# Patient Record
Sex: FEMALE | Race: WHITE | NOT HISPANIC OR LATINO | Employment: OTHER | URBAN - METROPOLITAN AREA
[De-identification: names, ages, dates, MRNs, and addresses within clinical notes are randomized per-mention and may not be internally consistent; named-entity substitution may affect disease eponyms.]

---

## 2022-11-24 ENCOUNTER — HOSPITAL ENCOUNTER (INPATIENT)
Facility: HOSPITAL | Age: 72
LOS: 7 days | Discharge: NURSING, REHAB OR LONG-TERM CARE | DRG: 041 | End: 2022-12-01
Attending: STUDENT IN AN ORGANIZED HEALTH CARE EDUCATION/TRAINING PROGRAM | Admitting: STUDENT IN AN ORGANIZED HEALTH CARE EDUCATION/TRAINING PROGRAM
Payer: MEDICARE

## 2022-11-24 ENCOUNTER — HISTORICAL (OUTPATIENT)
Dept: ADMINISTRATIVE | Facility: HOSPITAL | Age: 72
End: 2022-11-24

## 2022-11-24 DIAGNOSIS — I63.9 CEREBROVASCULAR ACCIDENT: ICD-10-CM

## 2022-11-24 DIAGNOSIS — I63.9 CVA (CEREBRAL VASCULAR ACCIDENT): ICD-10-CM

## 2022-11-24 DIAGNOSIS — I63.9 STROKE: ICD-10-CM

## 2022-11-24 DIAGNOSIS — I63.9 ACUTE ISCHEMIC STROKE: Primary | ICD-10-CM

## 2022-11-24 LAB
ALBUMIN SERPL-MCNC: 3.6 GM/DL (ref 3.4–4.8)
ALBUMIN/GLOB SERPL: 1 RATIO (ref 1.1–2)
ALP SERPL-CCNC: 78 UNIT/L (ref 40–150)
ALT SERPL-CCNC: 21 UNIT/L (ref 0–55)
AST SERPL-CCNC: 25 UNIT/L (ref 5–34)
BASOPHILS # BLD AUTO: 0.02 X10(3)/MCL (ref 0–0.2)
BASOPHILS NFR BLD AUTO: 0.3 %
BILIRUBIN DIRECT+TOT PNL SERPL-MCNC: 0.4 MG/DL
BUN SERPL-MCNC: 16.1 MG/DL (ref 9.8–20.1)
CALCIUM SERPL-MCNC: 9 MG/DL (ref 8.4–10.2)
CHLORIDE SERPL-SCNC: 103 MMOL/L (ref 98–107)
CO2 SERPL-SCNC: 23 MMOL/L (ref 23–31)
CREAT SERPL-MCNC: 0.87 MG/DL (ref 0.55–1.02)
EOSINOPHIL # BLD AUTO: 0.05 X10(3)/MCL (ref 0–0.9)
EOSINOPHIL NFR BLD AUTO: 0.8 %
ERYTHROCYTE [DISTWIDTH] IN BLOOD BY AUTOMATED COUNT: 13.4 % (ref 11.5–17)
GFR SERPLBLD CREATININE-BSD FMLA CKD-EPI: >60 MLS/MIN/1.73/M2
GLOBULIN SER-MCNC: 3.5 GM/DL (ref 2.4–3.5)
GLUCOSE SERPL-MCNC: 126 MG/DL (ref 82–115)
HCT VFR BLD AUTO: 40.5 % (ref 37–47)
HGB BLD-MCNC: 13.5 GM/DL (ref 12–16)
IMM GRANULOCYTES # BLD AUTO: 0.02 X10(3)/MCL (ref 0–0.04)
IMM GRANULOCYTES NFR BLD AUTO: 0.3 %
LYMPHOCYTES # BLD AUTO: 0.84 X10(3)/MCL (ref 0.6–4.6)
LYMPHOCYTES NFR BLD AUTO: 14.2 %
MCH RBC QN AUTO: 30.9 PG (ref 27–31)
MCHC RBC AUTO-ENTMCNC: 33.3 MG/DL (ref 33–36)
MCV RBC AUTO: 92.7 FL (ref 80–94)
MONOCYTES # BLD AUTO: 0.64 X10(3)/MCL (ref 0.1–1.3)
MONOCYTES NFR BLD AUTO: 10.8 %
NEUTROPHILS # BLD AUTO: 4.3 X10(3)/MCL (ref 2.1–9.2)
NEUTROPHILS NFR BLD AUTO: 73.6 %
NRBC BLD AUTO-RTO: 0 %
PLATELET # BLD AUTO: 186 X10(3)/MCL (ref 130–400)
PMV BLD AUTO: 9.9 FL (ref 7.4–10.4)
POTASSIUM SERPL-SCNC: 3.2 MMOL/L (ref 3.5–5.1)
PROT SERPL-MCNC: 7.1 GM/DL (ref 5.8–7.6)
RBC # BLD AUTO: 4.37 X10(6)/MCL (ref 4.2–5.4)
SODIUM SERPL-SCNC: 138 MMOL/L (ref 136–145)
WBC # SPEC AUTO: 5.9 X10(3)/MCL (ref 4.5–11.5)

## 2022-11-24 PROCEDURE — 80053 COMPREHEN METABOLIC PANEL: CPT | Performed by: STUDENT IN AN ORGANIZED HEALTH CARE EDUCATION/TRAINING PROGRAM

## 2022-11-24 PROCEDURE — 85025 COMPLETE CBC W/AUTO DIFF WBC: CPT | Performed by: STUDENT IN AN ORGANIZED HEALTH CARE EDUCATION/TRAINING PROGRAM

## 2022-11-24 PROCEDURE — 25500020 PHARM REV CODE 255: Performed by: STUDENT IN AN ORGANIZED HEALTH CARE EDUCATION/TRAINING PROGRAM

## 2022-11-24 PROCEDURE — 11000001 HC ACUTE MED/SURG PRIVATE ROOM

## 2022-11-24 PROCEDURE — 99285 EMERGENCY DEPT VISIT HI MDM: CPT | Mod: 25

## 2022-11-24 RX ADMIN — IOPAMIDOL 100 ML: 755 INJECTION, SOLUTION INTRAVENOUS at 07:11

## 2022-11-24 NOTE — Clinical Note
Diagnosis: Cerebrovascular accident [[id number]]   Admitting Provider:: FRANCISCO TRAN [[id number]]   Future Attending Provider: ZOILA ORDAZ [[id number]]   Reason for IP Medical Treatment  (Clinical interventions that can only be accomplished in the IP setting? ) :: MRI, neurology evaluation, PT/OT/ST   Estimated Length of Stay:: 2 midnights   I certify that Inpatient services for greater than or equal to 2 midnights are medically necessary:: Yes   Plans for Post-Acute care--if anticipated (pick the single best option):: A. No post acute care anticipated at this time

## 2022-11-25 LAB
ALBUMIN SERPL-MCNC: 3.4 GM/DL (ref 3.4–4.8)
ALBUMIN/GLOB SERPL: 1.3 RATIO (ref 1.1–2)
ALP SERPL-CCNC: 79 UNIT/L (ref 40–150)
ALT SERPL-CCNC: 26 UNIT/L (ref 0–55)
AST SERPL-CCNC: 30 UNIT/L (ref 5–34)
AV INDEX (PROSTH): 1.02
AV MEAN GRADIENT: 7 MMHG
AV PEAK GRADIENT: 12 MMHG
AV REGURGITATION PRESSURE HALF TIME: 539 MS
AV VALVE AREA: 3.21 CM2
AV VELOCITY RATIO: 0.97
BASOPHILS # BLD AUTO: 0.02 X10(3)/MCL (ref 0–0.2)
BASOPHILS NFR BLD AUTO: 0.3 %
BILIRUBIN DIRECT+TOT PNL SERPL-MCNC: 0.4 MG/DL
BSA FOR ECHO PROCEDURE: 2.06 M2
BUN SERPL-MCNC: 16.2 MG/DL (ref 9.8–20.1)
CALCIUM SERPL-MCNC: 8.7 MG/DL (ref 8.4–10.2)
CHLORIDE SERPL-SCNC: 103 MMOL/L (ref 98–107)
CHOLEST SERPL-MCNC: 112 MG/DL
CHOLEST/HDLC SERPL: 3 {RATIO} (ref 0–5)
CO2 SERPL-SCNC: 23 MMOL/L (ref 23–31)
CREAT SERPL-MCNC: 0.74 MG/DL (ref 0.55–1.02)
CRP SERPL HS-MCNC: 17.81 MG/L
CV ECHO LV RWT: 0.51 CM
DOP CALC AO PEAK VEL: 1.73 M/S
DOP CALC AO VTI: 36.9 CM
DOP CALC LVOT AREA: 3.1 CM2
DOP CALC LVOT DIAMETER: 2 CM
DOP CALC LVOT PEAK VEL: 1.68 M/S
DOP CALC LVOT STROKE VOLUME: 118.38 CM3
DOP CALC MV VTI: 36.4 CM
DOP CALCLVOT PEAK VEL VTI: 37.7 CM
E WAVE DECELERATION TIME: 322 MSEC
E/A RATIO: 0.66
E/E' RATIO: 18.2 M/S
ECHO LV POSTERIOR WALL: 1.1 CM (ref 0.6–1.1)
EJECTION FRACTION: 60 %
EOSINOPHIL # BLD AUTO: 0.06 X10(3)/MCL (ref 0–0.9)
EOSINOPHIL NFR BLD AUTO: 0.9 %
ERYTHROCYTE [DISTWIDTH] IN BLOOD BY AUTOMATED COUNT: 13.5 % (ref 11.5–17)
ERYTHROCYTE [SEDIMENTATION RATE] IN BLOOD: 43 MM/HR (ref 0–20)
FRACTIONAL SHORTENING: 34 % (ref 28–44)
GFR SERPLBLD CREATININE-BSD FMLA CKD-EPI: >60 MLS/MIN/1.73/M2
GLOBULIN SER-MCNC: 2.7 GM/DL (ref 2.4–3.5)
GLUCOSE SERPL-MCNC: 142 MG/DL (ref 82–115)
HCT VFR BLD AUTO: 37.7 % (ref 37–47)
HDLC SERPL-MCNC: 40 MG/DL (ref 35–60)
HGB BLD-MCNC: 12.3 GM/DL (ref 12–16)
IMM GRANULOCYTES # BLD AUTO: 0.03 X10(3)/MCL (ref 0–0.04)
IMM GRANULOCYTES NFR BLD AUTO: 0.5 %
INTERVENTRICULAR SEPTUM: 0.98 CM (ref 0.6–1.1)
LDLC SERPL CALC-MCNC: 59 MG/DL (ref 50–140)
LEFT ATRIUM SIZE: 3.2 CM
LEFT ATRIUM VOLUME INDEX MOD: 17.1 ML/M2
LEFT ATRIUM VOLUME MOD: 34.3 CM3
LEFT INTERNAL DIMENSION IN SYSTOLE: 2.81 CM (ref 2.1–4)
LEFT VENTRICLE DIASTOLIC VOLUME INDEX: 40.9 ML/M2
LEFT VENTRICLE DIASTOLIC VOLUME: 82.2 ML
LEFT VENTRICLE MASS INDEX: 74 G/M2
LEFT VENTRICLE SYSTOLIC VOLUME INDEX: 14.8 ML/M2
LEFT VENTRICLE SYSTOLIC VOLUME: 29.8 ML
LEFT VENTRICULAR INTERNAL DIMENSION IN DIASTOLE: 4.28 CM (ref 3.5–6)
LEFT VENTRICULAR MASS: 149.41 G
LV LATERAL E/E' RATIO: 15.17 M/S
LV SEPTAL E/E' RATIO: 22.75 M/S
LVOT MG: 6 MMHG
LVOT MV: 1.12 CM/S
LYMPHOCYTES # BLD AUTO: 1.03 X10(3)/MCL (ref 0.6–4.6)
LYMPHOCYTES NFR BLD AUTO: 15.9 %
MAGNESIUM SERPL-MCNC: 1.8 MG/DL (ref 1.6–2.6)
MCH RBC QN AUTO: 30.1 PG (ref 27–31)
MCHC RBC AUTO-ENTMCNC: 32.6 MG/DL (ref 33–36)
MCV RBC AUTO: 92.4 FL (ref 80–94)
MONOCYTES # BLD AUTO: 0.78 X10(3)/MCL (ref 0.1–1.3)
MONOCYTES NFR BLD AUTO: 12.1 %
MV MEAN GRADIENT: 2 MMHG
MV PEAK A VEL: 1.37 M/S
MV PEAK E VEL: 0.91 M/S
MV PEAK GRADIENT: 7 MMHG
MV STENOSIS PRESSURE HALF TIME: 49 MS
MV VALVE AREA BY CONTINUITY EQUATION: 3.25 CM2
MV VALVE AREA P 1/2 METHOD: 4.49 CM2
NEUTROPHILS # BLD AUTO: 4.6 X10(3)/MCL (ref 2.1–9.2)
NEUTROPHILS NFR BLD AUTO: 70.3 %
NRBC BLD AUTO-RTO: 0 %
PHOSPHATE SERPL-MCNC: 3.2 MG/DL (ref 2.3–4.7)
PISA AR MAX VEL: 4.08 M/S
PISA TR MAX VEL: 2.91 M/S
PLATELET # BLD AUTO: 183 X10(3)/MCL (ref 130–400)
PMV BLD AUTO: 9.6 FL (ref 7.4–10.4)
POTASSIUM SERPL-SCNC: 3.6 MMOL/L (ref 3.5–5.1)
PROT SERPL-MCNC: 6.1 GM/DL (ref 5.8–7.6)
PV PEAK VELOCITY: 1 CM/S
RA PRESSURE: 3 MMHG
RBC # BLD AUTO: 4.08 X10(6)/MCL (ref 4.2–5.4)
SODIUM SERPL-SCNC: 138 MMOL/L (ref 136–145)
TDI LATERAL: 0.06 M/S
TDI SEPTAL: 0.04 M/S
TDI: 0.05 M/S
TR MAX PG: 34 MMHG
TRICUSPID ANNULAR PLANE SYSTOLIC EXCURSION: 1.83 CM
TRIGL SERPL-MCNC: 67 MG/DL (ref 37–140)
TSH SERPL-ACNC: 1.63 UIU/ML (ref 0.35–4.94)
TV REST PULMONARY ARTERY PRESSURE: 37 MMHG
VLDLC SERPL CALC-MCNC: 13 MG/DL
WBC # SPEC AUTO: 6.5 X10(3)/MCL (ref 4.5–11.5)

## 2022-11-25 PROCEDURE — 63600175 PHARM REV CODE 636 W HCPCS: Performed by: NURSE PRACTITIONER

## 2022-11-25 PROCEDURE — 80061 LIPID PANEL: CPT | Performed by: NURSE PRACTITIONER

## 2022-11-25 PROCEDURE — 83735 ASSAY OF MAGNESIUM: CPT | Performed by: NURSE PRACTITIONER

## 2022-11-25 PROCEDURE — 25000003 PHARM REV CODE 250: Performed by: INTERNAL MEDICINE

## 2022-11-25 PROCEDURE — 25000003 PHARM REV CODE 250: Performed by: NURSE PRACTITIONER

## 2022-11-25 PROCEDURE — 80053 COMPREHEN METABOLIC PANEL: CPT | Performed by: NURSE PRACTITIONER

## 2022-11-25 PROCEDURE — 84100 ASSAY OF PHOSPHORUS: CPT | Performed by: NURSE PRACTITIONER

## 2022-11-25 PROCEDURE — 84443 ASSAY THYROID STIM HORMONE: CPT | Performed by: NURSE PRACTITIONER

## 2022-11-25 PROCEDURE — 21400001 HC TELEMETRY ROOM

## 2022-11-25 PROCEDURE — 92610 EVALUATE SWALLOWING FUNCTION: CPT

## 2022-11-25 PROCEDURE — 85651 RBC SED RATE NONAUTOMATED: CPT | Performed by: NURSE PRACTITIONER

## 2022-11-25 PROCEDURE — 85025 COMPLETE CBC W/AUTO DIFF WBC: CPT | Performed by: NURSE PRACTITIONER

## 2022-11-25 PROCEDURE — 99223 1ST HOSP IP/OBS HIGH 75: CPT | Mod: ,,, | Performed by: PSYCHIATRY & NEUROLOGY

## 2022-11-25 PROCEDURE — 86141 C-REACTIVE PROTEIN HS: CPT | Performed by: NURSE PRACTITIONER

## 2022-11-25 PROCEDURE — 99223 PR INITIAL HOSPITAL CARE,LEVL III: ICD-10-PCS | Mod: ,,, | Performed by: PSYCHIATRY & NEUROLOGY

## 2022-11-25 RX ORDER — ASPIRIN 81 MG/1
81 TABLET ORAL DAILY
Status: DISCONTINUED | OUTPATIENT
Start: 2022-11-25 | End: 2022-11-26

## 2022-11-25 RX ORDER — ATORVASTATIN CALCIUM 40 MG/1
80 TABLET, FILM COATED ORAL DAILY
Status: DISCONTINUED | OUTPATIENT
Start: 2022-11-25 | End: 2022-12-01 | Stop reason: HOSPADM

## 2022-11-25 RX ORDER — ROSUVASTATIN CALCIUM 20 MG/1
20 TABLET, COATED ORAL DAILY
COMMUNITY

## 2022-11-25 RX ORDER — GABAPENTIN 300 MG/1
300 CAPSULE ORAL 3 TIMES DAILY
COMMUNITY

## 2022-11-25 RX ORDER — GABAPENTIN 300 MG/1
300 CAPSULE ORAL 3 TIMES DAILY
Status: DISCONTINUED | OUTPATIENT
Start: 2022-11-25 | End: 2022-12-01 | Stop reason: HOSPADM

## 2022-11-25 RX ORDER — OXYCODONE AND ACETAMINOPHEN 5; 325 MG/1; MG/1
1 TABLET ORAL EVERY 6 HOURS PRN
Status: DISCONTINUED | OUTPATIENT
Start: 2022-11-25 | End: 2022-12-01 | Stop reason: HOSPADM

## 2022-11-25 RX ORDER — ENOXAPARIN SODIUM 100 MG/ML
40 INJECTION SUBCUTANEOUS EVERY 24 HOURS
Status: DISCONTINUED | OUTPATIENT
Start: 2022-11-25 | End: 2022-12-01 | Stop reason: HOSPADM

## 2022-11-25 RX ORDER — CLOPIDOGREL BISULFATE 75 MG/1
75 TABLET ORAL DAILY
Status: DISCONTINUED | OUTPATIENT
Start: 2022-11-25 | End: 2022-12-01 | Stop reason: HOSPADM

## 2022-11-25 RX ORDER — FUROSEMIDE 20 MG/1
20 TABLET ORAL DAILY PRN
COMMUNITY

## 2022-11-25 RX ORDER — CLOPIDOGREL BISULFATE 75 MG/1
75 TABLET ORAL DAILY
COMMUNITY

## 2022-11-25 RX ORDER — LORAZEPAM 2 MG/ML
1 INJECTION INTRAMUSCULAR ONCE
Status: COMPLETED | OUTPATIENT
Start: 2022-11-25 | End: 2022-11-25

## 2022-11-25 RX ORDER — AMLODIPINE BESYLATE 10 MG/1
10 TABLET ORAL DAILY
COMMUNITY

## 2022-11-25 RX ORDER — BENZONATATE 100 MG/1
100 CAPSULE ORAL 2 TIMES DAILY
COMMUNITY
End: 2023-01-27

## 2022-11-25 RX ORDER — ACETAMINOPHEN 325 MG/1
650 TABLET ORAL EVERY 6 HOURS PRN
Status: DISCONTINUED | OUTPATIENT
Start: 2022-11-25 | End: 2022-12-01 | Stop reason: HOSPADM

## 2022-11-25 RX ORDER — MUPIROCIN 20 MG/G
OINTMENT TOPICAL 2 TIMES DAILY
Status: DISPENSED | OUTPATIENT
Start: 2022-11-25 | End: 2022-11-30

## 2022-11-25 RX ORDER — LORAZEPAM 0.5 MG/1
0.5 TABLET ORAL EVERY 12 HOURS PRN
COMMUNITY

## 2022-11-25 RX ORDER — ONDANSETRON 2 MG/ML
4 INJECTION INTRAMUSCULAR; INTRAVENOUS EVERY 4 HOURS PRN
Status: DISCONTINUED | OUTPATIENT
Start: 2022-11-25 | End: 2022-12-01 | Stop reason: HOSPADM

## 2022-11-25 RX ORDER — LABETALOL HYDROCHLORIDE 5 MG/ML
10 INJECTION, SOLUTION INTRAVENOUS
Status: DISCONTINUED | OUTPATIENT
Start: 2022-11-25 | End: 2022-12-01 | Stop reason: HOSPADM

## 2022-11-25 RX ADMIN — ENOXAPARIN SODIUM 40 MG: 40 INJECTION SUBCUTANEOUS at 05:11

## 2022-11-25 RX ADMIN — OXYCODONE HYDROCHLORIDE AND ACETAMINOPHEN 1 TABLET: 5; 325 TABLET ORAL at 01:11

## 2022-11-25 RX ADMIN — GABAPENTIN 300 MG: 300 CAPSULE ORAL at 05:11

## 2022-11-25 RX ADMIN — CLOPIDOGREL BISULFATE 75 MG: 75 TABLET ORAL at 01:11

## 2022-11-25 RX ADMIN — LORAZEPAM 1 MG: 2 INJECTION INTRAMUSCULAR; INTRAVENOUS at 02:11

## 2022-11-25 RX ADMIN — ASPIRIN 81 MG: 81 TABLET, COATED ORAL at 01:11

## 2022-11-25 RX ADMIN — GABAPENTIN 300 MG: 300 CAPSULE ORAL at 09:11

## 2022-11-25 RX ADMIN — ATORVASTATIN CALCIUM 80 MG: 40 TABLET, FILM COATED ORAL at 01:11

## 2022-11-25 NOTE — PT/OT/SLP PROGRESS
Physical Therapy      Patient Name:  Darrion Bermudez   MRN:  [id number]    Patient not seen today secondary to pt on bedrest til later this PM. Will follow-up tomorrow as schedule permits.

## 2022-11-25 NOTE — PLAN OF CARE
11/25/22 1418   Discharge Assessment   Assessment Type Discharge Planning Assessment   Confirmed/corrected address, phone number and insurance Yes   Confirmed Demographics Correct on Facesheet  (Physical address: [address], [place])   Source of Information patient   Reason For Admission Stroke   Lives With alone  (Pt lives alone in a single story home with 4 steps to enter and celia rails)   Do you expect to return to your current living situation? Yes   Do you have help at home or someone to help you manage your care at home? Yes   Who are your caregiver(s) and their phone number(s)? Family   Prior to hospitilization cognitive status: Alert/Oriented   Current cognitive status: Alert/Oriented   Walking or Climbing Stairs Difficulty ambulation difficulty, requires equipment   Mobility Management rollator   Dressing/Bathing Difficulty none   Home Layout Able to live on 1st floor   Equipment Currently Used at Home rollator   Patient currently being followed by outpatient case management? No   Do you currently have service(s) that help you manage your care at home? No   Who is going to help you get home at discharge? Family   How do you get to doctors appointments? car, drives self   Are you on dialysis? No   Discharge Plan A   (Home with HH vs placement)   Discharge Plan B   (Home with home health vs placement)   DME Needed Upon Discharge  other (see comments)  (TBD)   Discharge Plan discussed with: Patient   Discharge Barriers Identified None   Housing Stability   In the last 12 months, was there a time when you were not able to pay the mortgage or rent on time? N   Transportation Needs   In the past 12 months, has lack of transportation kept you from medical appointments or from getting medications? no   Food Insecurity   Within the past 12 months, you worried that your food would run out before you got the money to buy more. Never true     Pt's PCP is Dr Mohammand Shalkh. Pt's son, Renny is her contact  person ([phone] or [phone]). Pt had HH in the past does not recall the name of the HH agency. Pt will have family to help her when she goes home if pt does not need placement. Follow for dc needs.

## 2022-11-25 NOTE — PT/OT/SLP EVAL
Speech Language Pathology Department  Clinical Swallow Evaluation    Patient Name:  Darrion Bermudez   MRN:  [id number]  Admitting Diagnosis: <principal problem not specified>    Recommendations:     General recommendations:  SLP follow up x1 for diet tolerance and Speech/Language and Cognitive Evaluation  Diet recommendations:  Regular, Liquid Diet Level: Thin   Precautions: Standard,      History:     No past medical history on file.    No past surgical history on file.    Chest X-Rays: NA    Home Diet: Regular and thin liquids  Current Method of Nutrition: NPO    Patient complaint: Reports being hungry    Subjective     Patient awake, calm, and cooperative.    Patient goals: to eat and drink by mouth. Return home at OF     Spiritual/Cultural/Scientology Beliefs/Practices that affect care: no    Pain/Comfort: Pain Rating 1: 0/10    Respiratory Status: room air    Objective:     Oral Musculature Evaluation  Oral Musculature: WFL  Dentition: scattered dentition  Secretion Management: adequate  Mucosal Quality: adequate  Oral Labial Strength and Mobility: WFL  Lingual Strength and Mobility: WFL    Consistency Fed By Oral Symptoms Pharyngeal Symptoms   Ice chips SLP None None   Thin liquid by cup Self None None   Thin liquid by straw Self None None   Puree Self None None   Chewable solid Self None None     Assessment:     Pt presents with WFL oropharyngeal swallow function with no signs/symptoms of aspiration demonstrated during this bedside swallow evaluation.     Goals:   Multidisciplinary Problems       SLP Goals       Not on file                  Patient Education:     Patient and family provided with verbal education regarding results/recommendations.  Understanding was verbalized.    Plan:     Patient to be seen:      Plan of Care expires:     Plan of Care reviewed with:  patient, family   SLP Follow-Up:  Yes      Time Tracking:     SLP Treatment Date:   11/25/22  Speech Start Time:  1100  Speech Stop Time:  1120      Speech Total Time (min):  20 min    Billable minutes:  Swallow and Oral Function Evaluation, 20 11/25/2022

## 2022-11-25 NOTE — H&P
[facility] Hospital Medicine   History & Physical Note      Patient Name: Darrion Bermudez  : 1950  MRN: [id number]  Patient Class: IP- Inpatient   Admission Date: 2022   Length of Stay: 0  Admitting Physician:  Service  Attending Physician: Nayana Sanon MD  PCP: No primary care provider on file.  Source of history: Patient, patient's family, and EMR.   Face-to-Face encounter date: 2022  Code status: --full      Chief Complaint   transfer (Transfer from [place] for neuro services.  1445 had R sided headache, right arm and leg weakness. Family declined TPA. )      History of Present Illness   Ms. Bermudez is a 71 year old female with PMH as below who presented to the ED as a transfer from [facility] for neuro services.  She originally presented to the ED with complaints of  headache and right arm and leg weakness/numbness.  CT head performed there negative.  There are conflicting stories about onset of symptoms, whether it was 3:00 this afternoon or starting yesterday.  Regardless, family declined tPA and she was transferred to [facility].     ED lab work performed here showed mild hypokalemia, otherwise unremarkable.  CTA head and neck showed moderate atheromatous calcification of the supraclinoid segment of the bilateral internal carotid artery is seen with mild stenosis.  Severe atheromatous calcification with moderate stenosis at the origin of the right internal carotid artery seen.  An acute to subacute infarct is seen in the left corona radiata.  Neurology was consulted in the ED.  The patient was admitted to Hospital Medicine for management.    ROS   Except as documented, all other systems reviewed and negative     Past Medical History   COPD  Hypertension  Hyperlipidemia  Anxiety  Lung Ca s/p Left lobectomy, chemo, radiation in 2017  CVA x 2 without residual deficit    Past Surgical History   Left CEA  Left carotid stent x 2  Left lobectomy  Right leg stent  Back  surgery x 2    Social History   Tobacco:  Former smoker  Etoh:  Not currently  Drugs:  Never       Family History   Reviewed and negative    Allergies   Codeine, Hydrocodone, and Iodine    Home Medications       Prior to Admission medications    Medication Sig Start Date End Date Taking? Authorizing Provider   amLODIPine (NORVASC) 10 MG tablet Take 10 mg by mouth once daily. At bedtime    Historical Provider   benzonatate (TESSALON) 100 MG capsule Take 100 mg by mouth 2 (two) times a day.    Historical Provider   clopidogreL (PLAVIX) 75 mg tablet Take 75 mg by mouth once daily.    Historical Provider   furosemide (LASIX) 20 MG tablet Take 20 mg by mouth once daily.    Historical Provider   gabapentin (NEURONTIN) 300 MG capsule Take 300 mg by mouth 3 (three) times daily.    Historical Provider   LORazepam (ATIVAN) 0.5 MG tablet Take 0.5 mg by mouth every 12 (twelve) hours as needed for Anxiety.    Historical Provider   rosuvastatin (CRESTOR) 20 MG tablet Take 20 mg by mouth once daily.    Historical Provider       Physical Exam   Vital Signs  Temp:  [98.2 °F (36.8 °C)]   Pulse:  [71-87]   Resp:  [18-20]   BP: (119-168)/(59-80)   SpO2:  [95 %-96 %]       General: Awake, alert, oriented, in no acute distress. C/o back pain.  HEENT: NC/AT  Neck:  Supple  Chest: Clear bilaterally, no wheezing or rales, no accessory muscle use   CVS: Regular rhythm. Normal S1/S2.  Abdomen: nondistended, normoactive BS, soft and non-tender.  MSK: No obvious deformity or joint swelling  Skin: Warm and dry  Neuro: AAOx3, answering questions, following commands. Decreased sensation on RUE/RLE.  Slight movement of RLE and of Right hand.  Psych: Cooperative    Labs     Recent Labs     11/24/22  1827   WBC 5.9   RBC 4.37   HGB 13.5   HCT 40.5   MCV 92.7   MCH 30.9   MCHC 33.3   RDW 13.4         Recent Labs     11/24/22  1828      K 3.2*   CHLORIDE 103   CO2 23   BUN 16.1   CREATININE 0.87   GLUCOSE 126*   CALCIUM 9.0   ALBUMIN 3.6    GLOBULIN 3.5   ALKPHOS 78   ALT 21   AST 25   BILITOT 0.4          Microbiology Results (last 7 days)       ** No results found for the last 168 hours. **           Imaging     CTA Head and Neck (xpd)         CT Previous   Final Result        Assessment & Plan   Acute to subacute ischemic CVA  Mild hypokalemia        Plan:  - Neurology consulted - appreciate recommendations  - MRI brain w/o pending  - Echo pending  - US NIVA bilateral carotids pending  - ST/PT/OT evaluation  - Potassium replacement  - Anti-hypertensives as needed  - Resume home meds as appropriate once reconciled  - Labs in AM    VTE Prophylaxis: Lovenox    SERENITY, Elisha Krens, AMANDA have reviewed and discussed this case with Dr. Sanon.  Please see addendum for further assessment and plan from attending MD.    ---------------------------    Nayana Sanon MD  I performed the substantive portion of this visit. I had a face-to-face time with the patient on [11/24/2022]. I reviewed and agree with the nurse practitioner's history, physical exam and plan of care.      History:  71-year-old female with history of CVA x2 with residual right hand weakness, prior left carotid endarterectomy in 2017 as well as 2 subsequent left carotid stenting in 2018 and 2019.  Transferred from outlying facility with acute onset of right dense hemiparesis.  CT head show acute to subacute infarct in the left corona radiata.    Physical exam: RUE 1/5, RLE 1/5    Medical decision making:  Acute ischemic stroke likely carotid embolic disease.  Continue aspirin Plavix and high-intensity statin.  Awaiting MRI for further delineation of the stroke.  Neurology consulted as well as physical therapy and occupational therapy evaluation.

## 2022-11-25 NOTE — ED PROVIDER NOTES
Encounter Date: 11/24/2022    SCRIBE #1 NOTE: I, Nati Soares, am scribing for, and in the presence of,  Francois Dunlap MD. I have scribed the following portions of the note - Other sections scribed: HPI, ROS, PE.     History     Chief Complaint   Patient presents with    transfer     Transfer from [facility] for neuro services.  1445 had R sided headache, right arm and leg weakness. Family declined TPA.      71 year old female w/ hx of CVA, stents, lung CA w/ lobectomy, and HTN presents to ED from [place] as a tx for neurological services. Pt reports lightheadedness for some time now but since this morning, the pt experienced a R sided headache with R arm and R leg weakness. Pt's family declined tPA. Pt is on plavix is ASA.    The history is provided by the patient. No  was used.   Neurologic Problem  The primary symptoms include headaches and focal weakness. Primary symptoms do not include fever, nausea or vomiting. The symptoms began today.   The headache began today. The headache developed suddenly. Location/region(s) of the headache: right unilateral. The headache is associated with weakness (R sided). The headache is not associated with eye pain or neck stiffness.   Weakness began 6 - 12 hours ago. There is decreased muscle function with maximum physical effort.   There is weakness in these regions/motions: right bicep flexion and left hip flexion. There is impairment of the following actions: walking.   Additional symptoms include weakness (R sided). Additional symptoms do not include neck stiffness. Medical issues also include cerebral vascular accident, cancer and hypertension.   Review of patient's allergies indicates:   Allergen Reactions    Codeine     Hydrocodone     Iodine      No past medical history on file.  No past surgical history on file.  No family history on file.     Review of Systems   Constitutional:  Negative for chills and fever.   HENT:  Negative for congestion,  drooling and sore throat.    Eyes:  Negative for pain and visual disturbance.   Respiratory:  Negative for chest tightness, shortness of breath and wheezing.    Cardiovascular:  Negative for chest pain, palpitations and leg swelling.   Gastrointestinal:  Negative for abdominal pain, nausea and vomiting.   Genitourinary:  Negative for dysuria and hematuria.   Musculoskeletal:  Negative for back pain, neck pain and neck stiffness.   Skin:  Negative for pallor and rash.   Neurological:  Positive for focal weakness, weakness (R sided) and headaches. Negative for numbness.   Hematological:  Does not bruise/bleed easily.     Physical Exam     Initial Vitals [11/24/22 1756]   BP Pulse Resp Temp SpO2   (!) 150/80 71 18 98.2 °F (36.8 °C) 96 %      MAP       --         Physical Exam    Nursing note and vitals reviewed.  Constitutional: She appears well-developed and well-nourished. She is not diaphoretic. No distress.   HENT:   Head: Normocephalic and atraumatic.   Nose: Nose normal.   Mouth/Throat: Oropharynx is clear and moist.   Eyes: EOM are normal. Pupils are equal, round, and reactive to light.   Neck: Neck supple.   Normal range of motion.  Cardiovascular:  Normal rate, regular rhythm, intact distal pulses and normal pulses.           No murmur heard.  Pulses:       Radial pulses are 2+ on the right side and 2+ on the left side.        Dorsalis pedis pulses are 2+ on the right side and 2+ on the left side.   Pulmonary/Chest: Breath sounds normal. No respiratory distress. She has no wheezes. She has no rales.   Abdominal: Abdomen is soft. She exhibits no distension. There is no abdominal tenderness.   Musculoskeletal:         General: No edema. Normal range of motion.      Cervical back: Normal range of motion and neck supple.     Neurological: She is alert and oriented to person, place, and time.   Pt is alert and oriented.   Decreased sensation in R arm and R leg.  Mild decreased strength in R arm and R leg.   Skin:  Skin is warm. Capillary refill takes less than 2 seconds. No rash noted.   Psychiatric: She has a normal mood and affect. Her behavior is normal.       ED Course   Critical Care    Date/Time: 11/29/2022 5:25 AM  Performed by: Francois Dunlap MD  Authorized by: Nayana Sanon MD   Direct patient critical care time: 45 minutes  Total critical care time (exclusive of procedural time) : 45 minutes  Critical care time was exclusive of separately billable procedures and treating other patients.  Critical care was necessary to treat or prevent imminent or life-threatening deterioration of the following conditions: CNS failure or compromise.  Critical care was time spent personally by me on the following activities: blood draw for specimens, development of treatment plan with patient or surrogate, discussions with consultants, evaluation of patient's response to treatment, examination of patient, obtaining history from patient or surrogate, ordering and performing treatments and interventions, ordering and review of laboratory studies, ordering and review of radiographic studies, pulse oximetry, re-evaluation of patient's condition and review of old charts.      Labs Reviewed   COMPREHENSIVE METABOLIC PANEL - Abnormal; Notable for the following components:       Result Value    Potassium Level 3.2 (*)     Glucose Level 126 (*)     Albumin/Globulin Ratio 1.0 (*)     All other components within normal limits   SEDIMENTATION RATE - Abnormal; Notable for the following components:    Sed Rate 43 (*)     All other components within normal limits   HIGH SENSITIVITY CRP - Abnormal; Notable for the following components:    C-Reactive Protein High Sensitivity 17.81 (*)     All other components within normal limits   COMPREHENSIVE METABOLIC PANEL - Abnormal; Notable for the following components:    Glucose Level 142 (*)     All other components within normal limits   CBC WITH DIFFERENTIAL - Abnormal; Notable for the following  components:    RBC 4.08 (*)     MCHC 32.6 (*)     All other components within normal limits   TSH - Normal   MAGNESIUM - Normal   PHOSPHORUS - Normal   CBC W/ AUTO DIFFERENTIAL    Narrative:     The following orders were created for panel order CBC auto differential.  Procedure                               Abnormality         Status                     ---------                               -----------         ------                     CBC with Differential[[id number]]                            Final result                 Please view results for these tests on the individual orders.   CBC WITH DIFFERENTIAL   LIPID PANEL   CBC W/ AUTO DIFFERENTIAL    Narrative:     The following orders were created for panel order CBC auto differential.  Procedure                               Abnormality         Status                     ---------                               -----------         ------                     CBC with Differential[[id number]]        Abnormal            Final result                 Please view results for these tests on the individual orders.        ECG Results    None       Imaging Results              CTA Head and Neck (xpd) (Final result)  Result time 11/25/22 09:35:41      Final result by Millie Bee MD (11/25/22 09:35:41)                   Impression:      1. No large vessel occlusion.  2. Atherosclerotic changes of the carotid arteries with moderate focal narrowing of the proximal left common carotid artery and 50-60% stenosis at the right carotid bulb by NASCET criteria.  No significant change from the [facility] interpretation.      Electronically signed by: Millie Bee  Date:    11/25/2022  Time:    09:35               Narrative:    EXAMINATION:  CTA HEAD AND NECK (XPD)    CLINICAL HISTORY:  Stroke/TIA, determine embolic source;    TECHNIQUE:  Axial images obtained through the cervical region and [place] before and after the administration of intravenous  contrast.    Coronal, sagittal, MIP and 3D reconstructions were obtained from the axial data.    Automatic exposure control was utilized to limit radiation dose.    Radiation Dose:    Total DLP: 2500 mGy*cm    COMPARISON:  Outside CT head dated 11/24/2022    FINDINGS:  Head CT with contrast:    No interval changes when compared to the previous CT.    No enhancing abnormalities.    If present, stenosis of the carotid bulbs is measured based on NASCET criteria,    i.e. area of maximal stenosis compared to the cervical ICA distal to the bulb.    Cervical CTA:    The origins of the great vessels are patent with moderate scattered calcifications and moderate focal narrowing of the proximal left common carotid artery.    There are atherosclerotic changes of the common carotid arteries with a stent in place on the left.  There are calcifications at the carotid bulbs with 50-60% stenosis on the right.  The internal carotid arteries are otherwise patent.    The dural venous sinuses are patent.    Intracranial CTA:    There are calcifications along the course of the carotid siphons without hemodynamically significant stenosis.  The middle cerebral arteries and anterior cerebral arteries are patent.    The vertebral arteries are patent with mild scattered calcifications.  The basilar artery and posterior cerebral arteries are patent.    The dural venous sinuses are patent.                        Preliminary result by Millie Bee MD (11/24/22 20:13:52)                   Narrative:    START OF REPORT:  Technique: CT angiogram of the intracranial vessels was performed with intravenous contrast with direct axial as well as sagittal and coronal reformations. CT angiogram of the neck vessels was performed with intravenous contrast with direct axial as well as sagittal and coronal reformations.    Comparison: None.    Clinical history: Right sided weakness.    Findings:  Intracranial Vascular structures:  Internal carotid  arteries: Moderate atheromatous calcification of the supraclinoid segment of the bilateral internal carotid artery is seen with mild stenosis.  Middle cerebral arteries: Unremarkable.  Anterior cerebral arteries: Unremarkable.  Vertebral arteries: Unremarkable.  Basilar artery: Unremarkable.  Posterior cerebral arteries: Unremarkable.  Posterior communicating arteries: Unremarkable.  Carotids:  Common carotid arteries: Severe atheromatous calcification of the mid and distal segments of the bilateral common carotid artery is seen with moderate stenosis in the right.  Internal carotid artery: Severe atheromatous calcification with moderate stenosis at the origin of the right internal carotid artery is seen.  Vertebral arteries: Unremarkable.  CSF spaces: The ventricles sulci and basal cisterns are within normal limits for age.  Brain parenchyma: There is preservation of the grey white junction throughout. Subtle microvascular change is seen in portions of the periventricular and deep white matter tracts.  Infarct: An acute to subacute infarct is seen in the left corona radiata.  Cerebellum: Unremarkable.  Sella and skull base: The sella appears to be within normal limits.  Cerebellopontine angles: Within normal limits.  Herniation: None.  Intracranial calcifications: Incidental note is made of bilateral choroid plexus calcification. Incidental note is made of some pineal region calcification. Incidental note is made of minimal basal ganglia calcifcation.    Miscellaneous: Air fluid levels are seen in the bilateral maxillary sinuses, consistent with sinusitis.      Impression:  1. Moderate atheromatous calcification of the supraclinoid segment of the bilateral internal carotid artery is seen with mild stenosis.  2. Severe atheromatous calcification with moderate stenosis at the origin of the right internal carotid artery is seen.  3. An acute to subacute infarct is seen in the left corona radiata. Recommend MRI  correlation for further evaluation.  4. Details and other findings as described above.                          Preliminary result by Lang Bauer MD (11/24/22 20:13:52)                   Narrative:    START OF REPORT:  Technique: CT angiogram of the intracranial vessels was performed with intravenous contrast with direct axial as well as sagittal and coronal reformations. CT angiogram of the neck vessels was performed with intravenous contrast with direct axial as well as sagittal and coronal reformations.    Comparison: None.    Clinical history: Right sided weakness.    Findings:  Intracranial Vascular structures:  Internal carotid arteries: Moderate atheromatous calcification of the supraclinoid segment of the bilateral internal carotid artery is seen with mild stenosis.  Middle cerebral arteries: Unremarkable.  Anterior cerebral arteries: Unremarkable.  Vertebral arteries: Unremarkable.  Basilar artery: Unremarkable.  Posterior cerebral arteries: Unremarkable.  Posterior communicating arteries: Unremarkable.  Carotids:  Common carotid arteries: Severe atheromatous calcification of the mid and distal segments of the bilateral common carotid artery is seen with moderate stenosis in the right.  Internal carotid artery: Severe atheromatous calcification with moderate stenosis at the origin of the right internal carotid artery is seen.  Vertebral arteries: Unremarkable.  CSF spaces: The ventricles sulci and basal cisterns are within normal limits for age.  Brain parenchyma: There is preservation of the grey white junction throughout. Subtle microvascular change is seen in portions of the periventricular and deep white matter tracts.  Infarct: An acute to subacute infarct is seen in the left corona radiata.  Cerebellum: Unremarkable.  Sella and skull base: The sella appears to be within normal limits.  Cerebellopontine angles: Within normal limits.  Herniation: None.  Intracranial calcifications: Incidental note  is made of bilateral choroid plexus calcification. Incidental note is made of some pineal region calcification. Incidental note is made of minimal basal ganglia calcifcation.    Miscellaneous: Air fluid levels are seen in the bilateral maxillary sinuses, consistent with sinusitis.      Impression:  1. Moderate atheromatous calcification of the supraclinoid segment of the bilateral internal carotid artery is seen with mild stenosis.  2. Severe atheromatous calcification with moderate stenosis at the origin of the right internal carotid artery is seen.  3. An acute to subacute infarct is seen in the left corona radiata. Recommend MRI correlation for further evaluation.  4. Details and other findings as described above.                                         Medications   acetaminophen tablet 650 mg (has no administration in time range)   labetaloL injection 10 mg (has no administration in time range)   ondansetron injection 4 mg (has no administration in time range)   enoxaparin injection 40 mg (40 mg Subcutaneous Not Given 11/28/22 1700)   oxyCODONE-acetaminophen 5-325 mg per tablet 1 tablet (1 tablet Oral Given 11/28/22 2015)   mupirocin 2 % ointment ( Nasal Given 11/28/22 2015)   clopidogreL tablet 75 mg (75 mg Oral Not Given 11/28/22 0905)   atorvastatin tablet 80 mg (80 mg Oral Given 11/28/22 0905)   gabapentin capsule 300 mg (300 mg Oral Given 11/28/22 2015)   docusate sodium capsule 100 mg (100 mg Oral Given 11/28/22 0905)   aspirin EC tablet 325 mg (325 mg Oral Given 11/28/22 0905)   LORazepam (ATIVAN) injection 1 mg (1 mg Intravenous Given 11/28/22 1712)   pantoprazole EC tablet 40 mg (40 mg Oral Given 11/28/22 0905)   amLODIPine tablet 10 mg (10 mg Oral Given 11/28/22 0905)   calcium carbonate 200 mg calcium (500 mg) chewable tablet 500 mg (500 mg Oral Given 11/28/22 2015)   LIDOcaine HCL 10 mg/ml (1%) injection (20 mLs Other Given 11/28/22 1719)   levalbuterol nebulizer solution 0.63 mg (has no  administration in time range)   iopamidoL (ISOVUE-370) injection 100 mL (100 mLs Intravenous Given 11/24/22 1917)   LORazepam injection 1 mg (1 mg Intravenous Given 11/25/22 1408)   methylPREDNISolone sodium succinate injection 60 mg (60 mg Intravenous Given 11/27/22 1152)     Medical Decision Making:   Differential Diagnosis:   CVA, TIA, ICH, electrolyte abnormalities ACS, arrhythmia, kidney injury, dehydration  Clinical Tests:   Lab Tests: Ordered and Reviewed  Radiological Study: Reviewed  Medical Tests: Reviewed  ED Management:  MDM: Darrion Bermudez is a 71 y.o. female with above past medical history who presents to the ED for neurology evaluation after an acute CVA. Vital signs reviewed.  Patient is afebrile and hemodynamically stable.  Patient had the risks and benefits of thrombolytics discussed at the outside hospital and has refused thrombolytics at this time as they feel the risks outweigh the benefits.  CT angio of the head and neck is pending as this was unable to be performed at the outside hospital.  Stroke Neurology has been consulted.  Lab results and imaging at the outside hospital are being reviewed currently.  Patient will require admission once the workup is complete.  Patient agrees with the plan of care and all questions answered at bedside.    Update:  CTA has been reviewed.  Neurology has been consulted and will evaluate the patient once admitted.  Hospital Medicine has been consulted and has accepted the patient for admission.    Dispo:  Admit    Data Reviewed/Counseling: I have personally reviewed the patient's vital signs, nursing notes, and other relevant tests, information, and imaging. I had a detailed discussion regarding the historical points, exam findings, and any diagnostic results supporting the discharge diagnosis.     Portions of this note were dictated using voice recognition software. Although it was reviewed for accuracy, some inherent voice recognition errors may have  occurred and be present in this document.    Other:   I have discussed this case with another health care provider.        Scribe Attestation:   Scribe #1: I performed the above scribed service and the documentation accurately describes the services I performed. I attest to the accuracy of the note.    Attending Attestation:           Physician Attestation for Scribe:  Physician Attestation Statement for Scribe #1: I, Francois Dunlap MD, reviewed documentation, as scribed by Nati Soares in my presence, and it is both accurate and complete.           ED Course as of 11/29/22 0527   Thu Nov 24, 2022 1816 Patient arrives as a transfer from outside hospital for Neurology evaluation for acute onset CVA with right upper and lower extremity weakness and sensory changes.  Patient was offered tPA at the outside hospital, however after risks and benefits discussion between her and her family members, they have declined tPA administration. [MC]      ED Course User Index  [MC] Francois Dunlap MD                 Clinical Impression:   Final diagnoses:  [I63.9] Cerebrovascular accident      ED Disposition Condition    Admit Stable                Francois Dunlap MD  11/29/22 0527

## 2022-11-25 NOTE — CONSULTS
Cc: R side weak    Hpi:  Time of onset sometime between 1230 and 300 pm yesterday  Ivtpa was not given at outside facility 2.2 remote h/o ICH  Pt is getting better    She is on asa/plavix  She is s/p LCEA and L carotid stent  She quit smoking 5 y ago due to lung cancer    No past medical history on file.  No past surgical history on file.  Social History     Socioeconomic History    Marital status: Single     No family history on file.    Vitals:    11/25/22 1042   BP: (!) 150/76   Pulse: 72   Resp:    Temp: 98 °F (36.7 °C)         Mental Status: Alert and oriented x3. Language is fluent with good comprehension.    Cranial Nerve: Pupils are equal, round, and reactive to light. Visual fields are intact to confrontation. Normal fundi. Ocular movements are intact. Face is symmetric at rest and with activation with intact sensation throughout. Hearing intact to finger rub bilaterally. Muscles of tongue and palate activate symmetrically. No dysarthria. Strength is full in sternocleidomastoid and trapezius bilaterally.    Motor: RUE 1/5; RLE 3/5;  5/5 elsewhere    Sensory: Sensation is intact to light touch, pinprick, vibration, and proprioception throughout. Romberg is negative.    Reflexes: 2+ and symmetric at the biceps, triceps, brachioradialis, patella, and Achilles bilaterally. Plantar response is flexor bilaterally.    Coordination: No dysmetria on finger-nose-finger or heel-knee-shin. Normal rapid alternating movements. Fast finger tapping with normal amplitude and speed.    Gait: Not testeed    Mri pending  CTA normal/negative  Ldl 59    Imp  Lacunar stroke  I do not see a great reason why she had a stroke  Continue asa/plavix for now  Continue statin as LDL at goal  Recc LINQ and checking a1c

## 2022-11-26 LAB
ANION GAP SERPL CALC-SCNC: 9 MEQ/L
BUN SERPL-MCNC: 12.5 MG/DL (ref 9.8–20.1)
CALCIUM SERPL-MCNC: 8.7 MG/DL (ref 8.4–10.2)
CHLORIDE SERPL-SCNC: 103 MMOL/L (ref 98–107)
CO2 SERPL-SCNC: 25 MMOL/L (ref 23–31)
CREAT SERPL-MCNC: 0.77 MG/DL (ref 0.55–1.02)
CREAT/UREA NIT SERPL: 16
EST. AVERAGE GLUCOSE BLD GHB EST-MCNC: 137 MG/DL
GFR SERPLBLD CREATININE-BSD FMLA CKD-EPI: >60 MLS/MIN/1.73/M2
GLUCOSE SERPL-MCNC: 108 MG/DL (ref 82–115)
HBA1C MFR BLD: 6.4 %
LEFT CCA DIST SYS: 139 CM/S
LEFT CCA PROX SYS: 149 CM/S
LEFT ECA SYS: 167 CM/S
LEFT ICA DIST DIAS: 21 CM/S
LEFT ICA DIST SYS: 71 CM/S
LEFT ICA MID DIAS: 18 CM/S
LEFT ICA MID SYS: 67 CM/S
LEFT ICA PROX DIAS: 19 CM/S
LEFT ICA PROX SYS: 91 CM/S
LEFT VERTEBRAL SYS: 78 CM/S
MAGNESIUM SERPL-MCNC: 2 MG/DL (ref 1.6–2.6)
OHS CV CAROTID RIGHT ICA EDV HIGHEST: 46
OHS CV CAROTID ULTRASOUND LEFT ICA/CCA RATIO: 0.65
OHS CV CAROTID ULTRASOUND RIGHT ICA/CCA RATIO: 3.11
OHS CV PV CAROTID LEFT HIGHEST CCA: 149
OHS CV PV CAROTID LEFT HIGHEST ICA: 91
OHS CV PV CAROTID RIGHT HIGHEST CCA: 66
OHS CV PV CAROTID RIGHT HIGHEST ICA: 205
OHS CV US CAROTID LEFT HIGHEST EDV: 21
PHOSPHATE SERPL-MCNC: 3.4 MG/DL (ref 2.3–4.7)
POTASSIUM SERPL-SCNC: 3.8 MMOL/L (ref 3.5–5.1)
RIGHT CCA DIST SYS: 66 CM/S
RIGHT CCA PROX SYS: 50 CM/S
RIGHT ECA SYS: 121 CM/S
RIGHT ICA DIST DIAS: 28 CM/S
RIGHT ICA DIST SYS: 154 CM/S
RIGHT ICA MID DIAS: 42 CM/S
RIGHT ICA MID SYS: 202 CM/S
RIGHT ICA PROX DIAS: 46 CM/S
RIGHT ICA PROX SYS: 205 CM/S
RIGHT VERTEBRAL SYS: 50 CM/S
SODIUM SERPL-SCNC: 137 MMOL/L (ref 136–145)

## 2022-11-26 PROCEDURE — 93010 EKG 12-LEAD: ICD-10-PCS | Mod: ,,, | Performed by: INTERNAL MEDICINE

## 2022-11-26 PROCEDURE — 93005 ELECTROCARDIOGRAM TRACING: CPT

## 2022-11-26 PROCEDURE — 25000003 PHARM REV CODE 250: Performed by: NURSE PRACTITIONER

## 2022-11-26 PROCEDURE — 84100 ASSAY OF PHOSPHORUS: CPT | Performed by: INTERNAL MEDICINE

## 2022-11-26 PROCEDURE — 97535 SELF CARE MNGMENT TRAINING: CPT

## 2022-11-26 PROCEDURE — 63600175 PHARM REV CODE 636 W HCPCS: Performed by: NURSE PRACTITIONER

## 2022-11-26 PROCEDURE — 36415 COLL VENOUS BLD VENIPUNCTURE: CPT | Performed by: PSYCHIATRY & NEUROLOGY

## 2022-11-26 PROCEDURE — 21400001 HC TELEMETRY ROOM

## 2022-11-26 PROCEDURE — 83735 ASSAY OF MAGNESIUM: CPT | Performed by: INTERNAL MEDICINE

## 2022-11-26 PROCEDURE — 97162 PT EVAL MOD COMPLEX 30 MIN: CPT

## 2022-11-26 PROCEDURE — 25000003 PHARM REV CODE 250: Performed by: INTERNAL MEDICINE

## 2022-11-26 PROCEDURE — 93010 ELECTROCARDIOGRAM REPORT: CPT | Mod: ,,, | Performed by: INTERNAL MEDICINE

## 2022-11-26 PROCEDURE — 36415 COLL VENOUS BLD VENIPUNCTURE: CPT | Performed by: INTERNAL MEDICINE

## 2022-11-26 PROCEDURE — 99233 SBSQ HOSP IP/OBS HIGH 50: CPT | Mod: ,,, | Performed by: PSYCHIATRY & NEUROLOGY

## 2022-11-26 PROCEDURE — 99233 PR SUBSEQUENT HOSPITAL CARE,LEVL III: ICD-10-PCS | Mod: ,,, | Performed by: PSYCHIATRY & NEUROLOGY

## 2022-11-26 PROCEDURE — 83036 HEMOGLOBIN GLYCOSYLATED A1C: CPT | Performed by: PSYCHIATRY & NEUROLOGY

## 2022-11-26 PROCEDURE — 97166 OT EVAL MOD COMPLEX 45 MIN: CPT

## 2022-11-26 PROCEDURE — 80048 BASIC METABOLIC PNL TOTAL CA: CPT | Performed by: INTERNAL MEDICINE

## 2022-11-26 RX ORDER — AMLODIPINE BESYLATE 5 MG/1
5 TABLET ORAL 2 TIMES DAILY
Status: DISCONTINUED | OUTPATIENT
Start: 2022-11-26 | End: 2022-11-28

## 2022-11-26 RX ORDER — DOCUSATE SODIUM 100 MG/1
100 CAPSULE, LIQUID FILLED ORAL DAILY
Status: DISCONTINUED | OUTPATIENT
Start: 2022-11-26 | End: 2022-12-01 | Stop reason: HOSPADM

## 2022-11-26 RX ORDER — ASPIRIN 325 MG
325 TABLET, DELAYED RELEASE (ENTERIC COATED) ORAL DAILY
Status: DISCONTINUED | OUTPATIENT
Start: 2022-11-27 | End: 2022-12-01 | Stop reason: HOSPADM

## 2022-11-26 RX ADMIN — GABAPENTIN 300 MG: 300 CAPSULE ORAL at 09:11

## 2022-11-26 RX ADMIN — GABAPENTIN 300 MG: 300 CAPSULE ORAL at 02:11

## 2022-11-26 RX ADMIN — ENOXAPARIN SODIUM 40 MG: 40 INJECTION SUBCUTANEOUS at 04:11

## 2022-11-26 RX ADMIN — AMLODIPINE BESYLATE 5 MG: 5 TABLET ORAL at 08:11

## 2022-11-26 RX ADMIN — OXYCODONE HYDROCHLORIDE AND ACETAMINOPHEN 1 TABLET: 5; 325 TABLET ORAL at 08:11

## 2022-11-26 RX ADMIN — DOCUSATE SODIUM 100 MG: 100 CAPSULE, LIQUID FILLED ORAL at 09:11

## 2022-11-26 RX ADMIN — ASPIRIN 81 MG: 81 TABLET, COATED ORAL at 09:11

## 2022-11-26 RX ADMIN — CLOPIDOGREL BISULFATE 75 MG: 75 TABLET ORAL at 09:11

## 2022-11-26 RX ADMIN — ATORVASTATIN CALCIUM 80 MG: 40 TABLET, FILM COATED ORAL at 09:11

## 2022-11-26 RX ADMIN — GABAPENTIN 300 MG: 300 CAPSULE ORAL at 08:11

## 2022-11-26 NOTE — PT/OT/SLP RE-EVAL
"Physical Therapy Re-evaluation    Patient Name:  Darrion Bermudez   MRN:  [id number]    Recommendations:     Discharge Recommendations:  rehabilitation facility   Discharge Equipment Recommendations:     Barriers to discharge: Decreased caregiver support and placement    Assessment:     Darrion Bermudez is a 71 y.o. female admitted with a medical diagnosis of acute lacunar stroke.  She presents with the following impairments/functional limitations:  weakness, impaired endurance, impaired self care skills, impaired functional mobility, gait instability, impaired balance, decreased upper extremity function, decreased lower extremity function. Pt with previous L upper lobe lobectomy which has resulted in chronic L flank pain with pushing/pulling with this extremity. She also reports chronic knee pain which she has recently (11/10) received injections in. Pt provides good effort but severity of deficits limits functional independence. PT recommends inpatient rehab for continued interventions beyond acute setting.    Rehab Prognosis:  good; patient would benefit from acute skilled PT services to address these deficits and reach maximum level of function.      Recent Surgery: * No surgery found *      Plan:     During this hospitalization, patient to be seen  (daily/BID) to address the above listed problems via gait training, therapeutic activities, therapeutic exercises, neuromuscular re-education  Plan of Care Expires:  12/26/22  Plan of Care Reviewed with: patient, son    Subjective     Communicated with nurse prior to session.  Patient found HOB elevated with telemetry, PureWick upon PT entry to room, agreeable to evaluation.      Chief Complaint: L flank pain with use of L arm  Patient comments/goals: to get stronger to "break it down" at granddaughter's wedding next month  Pain/Comfort:       Patients cultural, spiritual, [employer] conflicts given the current situation:        Objective:     Patient found with: " telemetry, PureWick     General Precautions: Standard, fall   Orthopedic Precautions:    Braces: N/A  Respiratory Status: Room air    Exams:  Cognitive Exam:  Patient is oriented to Person, Place, Time, and Situation  RLE ROM: WFL  RLE Strength: Deficits: grossly 3/5  LLE ROM: WFL  LLE Strength: WFL    Functional Mobility:  Bed Mobility:     Rolling Left:  minimum assistance with HOB elevated  Scooting: minimum assistance  Supine to Sit: moderate assistance  Sit to Supine: moderate assistance  Transfers:     Sit to Stand:  minimum assistance and moderate assistance with hand-held assist  Gait: Lateral steps to HOB x 4 steps with blocking of R knee and therapist support GEORGE  Balance: Supported standing balance poor with buckling of R knee, limited use of R UE for balance      Patient left HOB elevated with all lines intact and call button in reach.    GOALS:   Multidisciplinary Problems       Physical Therapy Goals          Problem: Physical Therapy    Goal Priority Disciplines Outcome Goal Variances Interventions   Physical Therapy Goal     PT, PT/OT Ongoing, Progressing     Description: Goals to be met by: 2022     Patient will increase functional independence with mobility by performin. Supine to sit with Modified [place]  2. Sit to supine with Modified [place]  3. Sit to stand transfer with Modified [place]  4. Bed to chair transfer with Modified [place] using Rolling Walker  5. Gait  x 300 feet with Modified [place] using Rolling Walker.                          History:     No past medical history on file.    No past surgical history on file.    Time Tracking:     PT Received On:    PT Start Time: 1305     PT Stop Time: 1345  PT Total Time (min): 40 min     Billable Minutes: Evaluation (mod)      2022

## 2022-11-26 NOTE — PLAN OF CARE
Problem: Physical Therapy  Goal: Physical Therapy Goal  Description: Goals to be met by: 2022     Patient will increase functional independence with mobility by performin. Supine to sit with Modified [place]  2. Sit to supine with Modified [place]  3. Sit to stand transfer with Modified [place]  4. Bed to chair transfer with Modified [place] using Rolling Walker  5. Gait  x 300 feet with Modified [place] using Rolling Walker.     Outcome: Ongoing, Progressing

## 2022-11-26 NOTE — PROGRESS NOTES
[facility] Hospital Medicine Progress Note        Chief Complaint: Inpatient Follow-up for acute stroke     HPI:   Ms. Bermudez is a 71 year old female with PMH as below who presented to the ED as a transfer from [facility] for neuro services.  She originally presented to the ED with complaints of  headache and right arm and leg weakness/numbness.  CT head performed there negative.  There are conflicting stories about onset of symptoms, whether it was 3:00 this afternoon or starting yesterday.  Regardless, family declined tPA and she was transferred to [facility].      ED lab work performed here showed mild hypokalemia, otherwise unremarkable.  CTA head and neck showed moderate atheromatous calcification of the supraclinoid segment of the bilateral internal carotid artery is seen with mild stenosis.  Severe atheromatous calcification with moderate stenosis at the origin of the right internal carotid artery seen.  An acute to subacute infarct is seen in the left corona radiata.  Neurology was consulted in the ED.  The patient was admitted to Hospital Medicine for management.        Interval Hx:   Pt is fully awake , alert, oriented x3, speech is clear and fluent. Remains with Rt sided weakness with muscle power RUE 1/5, RLE 3/5, Left U/L Ext 5/5. MRI brain attempted x 2 , but pt was unable to undergo due to severe anxiety and claustrophobia. Ativan IV was tried. SLP cleared pt for regular diet. PT/OT to see pt.  Continue ASA, Plavix , statin , permissive HTN x 24h. Cardiology consulted to evaluate for LINQ placement.     Objective/physical exam:  General: In no acute distress, afebrile  Chest: Clear to auscultation bilaterally  Heart: RRR, +S1, S2, no appreciable murmur  Abdomen: Soft, nontender, BS +  MSK: Warm, no lower extremity edema, no clubbing or cyanosis  Neurologic: Alert and oriented x4, Cranial nerve II-XII intact, Strength 1/5 RUE, 3/5, RLE,5/5 left U/L ext.     VITAL SIGNS: 24  HRS MIN & MAX LAST   Temp  Min: 97.8 °F (36.6 °C)  Max: 98.2 °F (36.8 °C) 98.2 °F (36.8 °C)   BP  Min: 118/56  Max: 150/76 (!) 118/56     Pulse  Min: 70  Max: 87  70   Resp  Min: 18  Max: 20 20   SpO2  Min: 93 %  Max: 99 % (!) 93 %         Recent Labs   Lab 11/24/22 1827 11/25/22  0309   WBC 5.9 6.5   RBC 4.37 4.08*   HGB 13.5 12.3   HCT 40.5 37.7   MCV 92.7 92.4   MCH 30.9 30.1   MCHC 33.3 32.6*   RDW 13.4 13.5    183   MPV 9.9 9.6       Recent Labs   Lab 11/24/22 1828 11/25/22  0309    138   K 3.2* 3.6   CO2 23 23   BUN 16.1 16.2   CREATININE 0.87 0.74   CALCIUM 9.0 8.7   MG  --  1.80   ALBUMIN 3.6 3.4   ALKPHOS 78 79   ALT 21 26   AST 25 30   BILITOT 0.4 0.4          Microbiology Results (last 7 days)       ** No results found for the last 168 hours. **             See below for Radiology    Scheduled Med:   aspirin  81 mg Oral Daily    atorvastatin  80 mg Oral Daily    clopidogreL  75 mg Oral Daily    enoxaparin  40 mg Subcutaneous Daily    gabapentin  300 mg Oral TID    mupirocin   Nasal BID    potassium bicarbonate  50 mEq Oral Once        Continuous Infusions:       PRN Meds:  acetaminophen, labetalol, ondansetron, oxyCODONE-acetaminophen       Assessment/Plan:  Acute Ischemic stroke - CTA head showed an acute to subacute infarct is seen in the left corona radiata.  Prior H/O CVA x 2 without residual deficit   Essential HTN  Hyperlipidemia   COPD without acute exacerbation   Former smoker   H/O Lung cancer Ca s/p Left lobectomy, chemo, radiation in 2017    Plan-   Continue ASA, plavix , high intensity statin   Permissive HTN x 24h   MRI brain attempted x 2, however pt was not able to undergo due to severe anxiety and claustrophobia   PT/OT/SLP eval       VTE prophylaxis: Lovenox     Patient condition:  Fair.     Anticipated discharge and Disposition:         All diagnosis and differential diagnosis have been reviewed; assessment and plan has been documented; I have personally reviewed the labs  and test results that are presently available; I have reviewed the patients medication list; I have reviewed the consulting providers response and recommendations. I have reviewed or attempted to review medical records based upon their availability    All of the patient's questions have been  addressed and answered. Patient's is agreeable to the above stated plan. I will continue to monitor closely and make adjustments to medical management as needed.  _____________________________________________________________________    Nutrition Status:    Radiology:  Echo  · There is no evidence of intracardiac shunting.  · The left ventricle is normal in size with normal systolic function.  · The estimated ejection fraction is 60-65%.  · Mild-to-moderate mitral regurgitation.  · Grade I left ventricular diastolic dysfunction.  · Mild-to-moderate aortic regurgitation.  · Mild tricuspid regurgitation.  · Normal central venous pressure (3 mmHg).  · The estimated PA systolic pressure is 37 mmHg.     CTA Head and Neck (xpd)  Narrative: EXAMINATION:  CTA HEAD AND NECK (XPD)    CLINICAL HISTORY:  Stroke/TIA, determine embolic source;    TECHNIQUE:  Axial images obtained through the cervical region and [place] before and after the administration of intravenous contrast.    Coronal, sagittal, MIP and 3D reconstructions were obtained from the axial data.    Automatic exposure control was utilized to limit radiation dose.    Radiation Dose:    Total DLP: 2500 mGy*cm    COMPARISON:  Outside CT head dated 11/24/2022    FINDINGS:  Head CT with contrast:    No interval changes when compared to the previous CT.    No enhancing abnormalities.    If present, stenosis of the carotid bulbs is measured based on NASCET criteria,    i.e. area of maximal stenosis compared to the cervical ICA distal to the bulb.    Cervical CTA:    The origins of the great vessels are patent with moderate scattered calcifications and moderate focal narrowing  of the proximal left common carotid artery.    There are atherosclerotic changes of the common carotid arteries with a stent in place on the left.  There are calcifications at the carotid bulbs with 50-60% stenosis on the right.  The internal carotid arteries are otherwise patent.    The dural venous sinuses are patent.    Intracranial CTA:    There are calcifications along the course of the carotid siphons without hemodynamically significant stenosis.  The middle cerebral arteries and anterior cerebral arteries are patent.    The vertebral arteries are patent with mild scattered calcifications.  The basilar artery and posterior cerebral arteries are patent.    The dural venous sinuses are patent.  Impression: 1. No large vessel occlusion.  2. Atherosclerotic changes of the carotid arteries with moderate focal narrowing of the proximal left common carotid artery and 50-60% stenosis at the right carotid bulb by NASCET criteria.  No significant change from the [facility] interpretation.    Electronically signed by: Millie Bee  Date:    11/25/2022  Time:    09:35      Leo Villalta MD   11/25/2022

## 2022-11-26 NOTE — PROGRESS NOTES
[facility] Hospital Medicine Progress Note        Chief Complaint: Inpatient Follow-up for acute stroke     HPI:   Ms. Bermudez is a 71 year old female with PMH as below who presented to the ED as a transfer from [facility] for neuro services.  She originally presented to the ED with complaints of  headache and right arm and leg weakness/numbness.  CT head performed there negative.  There are conflicting stories about onset of symptoms, whether it was 3:00 this afternoon or starting yesterday.  Regardless, family declined tPA and she was transferred to [facility].      ED lab work performed here showed mild hypokalemia, otherwise unremarkable.  CTA head and neck showed moderate atheromatous calcification of the supraclinoid segment of the bilateral internal carotid artery is seen with mild stenosis.  Severe atheromatous calcification with moderate stenosis at the origin of the right internal carotid artery seen.  An acute to subacute infarct is seen in the left corona radiata.  Neurology was consulted in the ED.  The patient was admitted to Hospital Medicine for management.    11/25-  Pt is fully awake , alert, oriented x3, speech is clear and fluent. Remains with Rt sided weakness with muscle power RUE 1/5, RLE 3/5, Left U/L Ext 5/5. MRI brain attempted x 2 , but pt was unable to undergo due to severe anxiety and claustrophobia. Ativan IV was tried. SLP cleared pt for regular diet. PT/OT to see pt.  Continue ASA, Plavix , statin , permissive HTN x 24h. Cardiology consulted to evaluate for LINQ placement.         Interval Hx:   AAOx3.Speech is clear and fluent . Remains with Rt sided weakness with MS 2/5 Rt U/L ext. No sensory loss. Cardiology consult obtained and possible LINQ placement on Monday. Neuro suggested to increase ASA to 325 mg. Continue Plavix and high intensity statin. PT/OT suggested Rehab placement.       Objective/physical exam:    General: In no acute distress,  afebrile  Chest: Clear to auscultation bilaterally  Heart: RRR, +S1, S2, no appreciable murmur  Abdomen: Soft, nontender, BS +  MSK: Warm, no lower extremity edema, no clubbing or cyanosis  Neurologic: Alert and oriented x4, Cranial nerve II-XII intact, Strength 2/5 RUE, 2/5, RLE,5/5 left U/L ext.     VITAL SIGNS: 24 HRS MIN & MAX LAST   Temp  Min: 97.6 °F (36.4 °C)  Max: 98.3 °F (36.8 °C) 97.9 °F (36.6 °C)   BP  Min: 118/56  Max: 152/80 (!) 146/73     Pulse  Min: 68  Max: 73  72   Resp  Min: 16  Max: 20 18   SpO2  Min: 91 %  Max: 94 % (!) 93 %         Recent Labs   Lab 11/24/22 1827 11/25/22  0309   WBC 5.9 6.5   RBC 4.37 4.08*   HGB 13.5 12.3   HCT 40.5 37.7   MCV 92.7 92.4   MCH 30.9 30.1   MCHC 33.3 32.6*   RDW 13.4 13.5    183   MPV 9.9 9.6       Recent Labs   Lab 11/24/22  1828 11/25/22  0309 11/26/22  0621    138 137   K 3.2* 3.6 3.8   CO2 23 23 25   BUN 16.1 16.2 12.5   CREATININE 0.87 0.74 0.77   CALCIUM 9.0 8.7 8.7   MG  --  1.80 2.00   ALBUMIN 3.6 3.4  --    ALKPHOS 78 79  --    ALT 21 26  --    AST 25 30  --    BILITOT 0.4 0.4  --           Microbiology Results (last 7 days)       ** No results found for the last 168 hours. **             See below for Radiology    Scheduled Med:   [START ON 11/27/2022] aspirin  325 mg Oral Daily    atorvastatin  80 mg Oral Daily    clopidogreL  75 mg Oral Daily    docusate sodium  100 mg Oral Daily    enoxaparin  40 mg Subcutaneous Daily    gabapentin  300 mg Oral TID    mupirocin   Nasal BID    potassium bicarbonate  50 mEq Oral Once        Continuous Infusions:       PRN Meds:  acetaminophen, labetalol, ondansetron, oxyCODONE-acetaminophen       Assessment/Plan:  Acute Ischemic stroke - CTA head showed an acute to subacute infarct is seen in the left corona radiata.  Prior H/O CVA x 2 without residual deficit   Essential HTN  Hyperlipidemia   COPD without acute exacerbation   Former smoker   H/O Lung cancer Ca s/p Left lobectomy, chemo, radiation in  2017     Plan-   ASA increased to 325 mg daily  Continue plavix , high intensity statin   Permissive HTN x 24h   MRI brain attempted x 2, however pt was not able to undergo due to severe anxiety and claustrophobia   PT/OT/SLP eval   Cardiology consult for LINQ placement   Neurology is following     VTE prophylaxis: Lovenox     Patient condition:  Stable/Fair/Guarded/ Serious/ Critical    Anticipated discharge and Disposition:         All diagnosis and differential diagnosis have been reviewed; assessment and plan has been documented; I have personally reviewed the labs and test results that are presently available; I have reviewed the patients medication list; I have reviewed the consulting providers response and recommendations. I have reviewed or attempted to review medical records based upon their availability    All of the patient's questions have been  addressed and answered. Patient's is agreeable to the above stated plan. I will continue to monitor closely and make adjustments to medical management as needed.  _____________________________________________________________________    Nutrition Status:    Radiology:  Echo  · There is no evidence of intracardiac shunting.  · The left ventricle is normal in size with normal systolic function.  · The estimated ejection fraction is 60-65%.  · Mild-to-moderate mitral regurgitation.  · Grade I left ventricular diastolic dysfunction.  · Mild-to-moderate aortic regurgitation.  · Mild tricuspid regurgitation.  · Normal central venous pressure (3 mmHg).  · The estimated PA systolic pressure is 37 mmHg.     CTA Head and Neck (xpd)  Narrative: EXAMINATION:  CTA HEAD AND NECK (XPD)    CLINICAL HISTORY:  Stroke/TIA, determine embolic source;    TECHNIQUE:  Axial images obtained through the cervical region and [place] before and after the administration of intravenous contrast.    Coronal, sagittal, MIP and 3D reconstructions were obtained from the axial  data.    Automatic exposure control was utilized to limit radiation dose.    Radiation Dose:    Total DLP: 2500 mGy*cm    COMPARISON:  Outside CT head dated 11/24/2022    FINDINGS:  Head CT with contrast:    No interval changes when compared to the previous CT.    No enhancing abnormalities.    If present, stenosis of the carotid bulbs is measured based on NASCET criteria,    i.e. area of maximal stenosis compared to the cervical ICA distal to the bulb.    Cervical CTA:    The origins of the great vessels are patent with moderate scattered calcifications and moderate focal narrowing of the proximal left common carotid artery.    There are atherosclerotic changes of the common carotid arteries with a stent in place on the left.  There are calcifications at the carotid bulbs with 50-60% stenosis on the right.  The internal carotid arteries are otherwise patent.    The dural venous sinuses are patent.    Intracranial CTA:    There are calcifications along the course of the carotid siphons without hemodynamically significant stenosis.  The middle cerebral arteries and anterior cerebral arteries are patent.    The vertebral arteries are patent with mild scattered calcifications.  The basilar artery and posterior cerebral arteries are patent.    The dural venous sinuses are patent.  Impression: 1. No large vessel occlusion.  2. Atherosclerotic changes of the carotid arteries with moderate focal narrowing of the proximal left common carotid artery and 50-60% stenosis at the right carotid bulb by NASCET criteria.  No significant change from the [facility] interpretation.    Electronically signed by: Millie Bee  Date:    11/25/2022  Time:    09:35      Leo Villalta MD   11/26/2022

## 2022-11-26 NOTE — PT/OT/SLP PROGRESS
SLP followed up with nursing regarding diet tolerance. Nursing reports no difficulties and/or signs/sx of aspiration with PO intake. SLP to continue to follow and complete speech, language, and cognitive-communicative evaluation.

## 2022-11-26 NOTE — PLAN OF CARE
Problem: Occupational Therapy  Goal: Occupational Therapy Goal  Description: Pt will t/f bed/chair <>BSC min A  Pt will increase R UE  strength to 3/5.  Pt will complete UE dressing min A  Pt will complete grooming sitting at sink SBA  Pt will increase static standing with CGA with hemiwalker.  Outcome: Ongoing, Progressing

## 2022-11-26 NOTE — PT/OT/SLP EVAL
"Occupational Therapy   Evaluation    Name: Darrion Bermudez  MRN: [id number]  Ed due to: headache, R arm and LE weakness  Admitting Diagnosis:  <principal problem not specified> acute to subacute ischemic stroke  Med hx: h/o CVA x 2 without residual deficits, HTN, COPD, h/o lung CA s/p L lobectomy  Recent Surgery: * No surgery found *      Recommendations:     Discharge Recommendations: rehabilitation facility  Discharge Equipment Recommendations:     Barriers to discharge:       Assessment:     Darrion Bermudez is a 71 y.o. female with a medical diagnosis of L CVA.  She presents with R hemiparesis, decreased balance, generalized weakness. Performance deficits affecting function: weakness, impaired endurance, impaired sensation, impaired self care skills, impaired functional mobility, impaired balance, decreased upper extremity function, decreased lower extremity function.      Rehab Prognosis: Good; patient would benefit from acute skilled OT services to address these deficits and reach maximum level of function.       Plan:     Patient to be seen daily to address the above listed problems via self-care/home management, therapeutic activities, therapeutic exercises, neuromuscular re-education  Plan of Care Expires:    Plan of Care Reviewed with: patient    Subjective     Chief Complaint: chronic pain L side/history of lung CA/lobectomy  Patient/Family Comments/goals: to return home, return to PLOF, "be able to dance at my granddaughter's wedding"    Occupational Profile:  Living Environment: resides at home alone, 4 steps to enter with B railings, tub only with hand held shower with shower chair (steps over, unable to fit TTB)  Previous level of function: I with ADL's, ambulatory with walker  Roles and Routines:   Equipment Used at Home:  walker, rolling, shower chair (rollator)  Assistance upon Discharge: seeking inpatient rehab    Pain/Comfort:  Pain Rating 1:  (pt c/o L side , chronic, due to history of breast " CA/lobectomy)    Patients cultural, spiritual, Rastafarian conflicts given the current situation:      Objective:     Communicated with: RN prior to session.  Patient found HOB elevated with   upon OT entry to room.    General Precautions: Standard, fall   Orthopedic Precautions:    Braces:    Respiratory Status: Room air  /76, HR 81    Occupational Performance:    Bed Mobility:    Patient completed Rolling/Turning to Left with  minimum assistance, with side rail, and with HOB elevated  Patient completed Scooting/Bridging with minimum assistance  Patient completed Supine to Sit with minimum assistance with HOB elevated at use of bed railing  Patient completed Sit to Supine with moderate assistance    Functional Mobility/Transfers:  Patient completed Sit <> Stand Transfer with moderate assistance  with  hand-held assist   Functional Mobility: with initial stand pt R LE buckled, 2nd trial improvement, req'd mod A to take donte 4 lateral steps along EOB with blocking of R LE    Activities of Daily Living:  Feeding:  set up self fed with L UE  Upper Body Dressing: maximal assistance .  Lower Body Dressing: donned  L sock figure 4 SBA total R sock  Toileting: maximal assistance .    Cognitive/Visual Perceptual:  Cognitive/Psychosocial Skills:     -       Oriented to: Person, Place, Time, and Situation   -       Follows Commands/attention:Follows multistep  commands  -       Mood/Affect/Coping skills/emotional control: Cooperative, Pleasant, and good participation and effort    Physical Exam:  Balance: -       standing balance mod A, sitting balance SBA  Upper Extremity Strength:    -       Right Upper Extremity: Deficits: shld 1/5, elbow flexion 2-/5,  strength 2-/5  -       Left Upper Extremity: WFL  Coordination: RUE deficits with finger opposition and finger to nose  Sensation: light touch intact  RUE    AMPAC 6 Click ADL:  AMPAC Total Score:      Treatment & Education:  Education on OT POC, education on  recommending inpatient rehab discussed with son at bedside, education on R UE AAROM/PROM    Patient left with bed in chair position with call button in reach and son present    GOALS:   Multidisciplinary Problems       Occupational Therapy Goals          Problem: Occupational Therapy    Goal Priority Disciplines Outcome Interventions   Occupational Therapy Goal     OT, PT/OT Ongoing, Progressing    Description: Pt will t/f bed/chair <>BSC min A  Pt will increase R UE  strength to 3/5.  Pt will complete UE dressing min A  Pt will complete grooming sitting at sink SBA  Pt will increase static standing with CGA with hemiwalker.                       History:     No past medical history on file.    No past surgical history on file.    Time Tracking:     OT Date of Treatment:    OT Start Time: 1305  OT Stop Time: 1345  OT Total Time (min): 40 min    Billable Minutes:Evaluation mod comp  Self Care/Home Management 1 unit    11/26/2022

## 2022-11-26 NOTE — PROGRESS NOTES
S: R sided strength is improving; still likely unable to walk  Seen by Cards; LINQ for Monday    Vitals:    11/26/22 1151   BP: (!) 152/80   Pulse: 68   Resp: 19   Temp: 98.3 °F (36.8 °C)       Mental Status: Alert and oriented x3. Language is fluent with good comprehension.    Cranial Nerve: Pupils are equal, round, and reactive to light. Visual fields are intact to confrontation. Normal fundi. Ocular movements are intact. Face is symmetric at rest and with activation with intact sensation throughout. Hearing intact to finger rub bilaterally. Muscles of tongue and palate activate symmetrically. No dysarthria. Strength is full in sternocleidomastoid and trapezius bilaterally.    Motor: R side 2-3/5    Sensory: Sensation is intact to light touch, pinprick, vibration, and proprioception throughout. Romberg is negative.    Reflexes: 2+ and symmetric at the biceps, triceps, brachioradialis, patella, and Achilles bilaterally. Plantar response is flexor bilaterally.    Coordination: No dysmetria on finger-nose-finger or heel-knee-shin. Normal rapid alternating movements. Fast finger tapping with normal amplitude and speed.    Gait: Narrow based with normal stride length and good arm swing bilaterally. Able to walk on heels, toes, and in tandem.      A/p  AIS; lacunar  Did not tolerate MRI  For now, pending LINQ results; will increase ASA to 325 andcontinue the plavix  Recc inpatient rehab near her home ([place])    Signing off

## 2022-11-26 NOTE — CONSULTS
Inpatient consult to Cardiology  Consult performed by: JEANETH Desai  Consult ordered by: Leo Villalta MD    [facility]  Cardiology  Consult Note    Patient Name: Darrion Bermudez  MRN: [id number]  Admission Date: 11/24/2022  Hospital Length of Stay: 2 days  Code Status: Full Code   Attending Provider: Nayana Sanon MD   Consulting Provider: JEANETH Desai  Primary Care Physician: No primary care provider on file.  Principal Problem:<principal problem not specified>    Patient information was obtained from patient, past medical records, and ER records.     Subjective:     Chief Complaint:       HPI:   Ms. Bermudez is a 70 y/o female, unknown to [facility], with PMHx significant for CVA, lung cancer/lobectomy, and HTN who presented to [facility] ED with c/o Right sided weakness and headache pain, onset 6-12hr PTA. CTA head negative. She was transferred to [facility] for neurology services. CTA head/neck negative LVO but does reveal p LCCA focal narrowing and 50-60% stenosis Right carotid bulb. An acute to subacute infarct is seen in left corona radiata. MRI Pending. Neurology has been consulted and is requesting LINQ placement. Echo reveals normal EF with G1DD. No bubble study completed. Patient has been in SR since admit    PMH: ICH, ROMANA/LCEA/stent, COPD, HTN, HLD, anxiety, lung cancer/lobectomy/ chemo and radiation 2017, CVA x 2 without residual deficit  PSH: Left CEA; L Carotid stent x 2, left lobectomy, right leg stent, back surgery x 2  Family History: none  Social History: former smoker, Denies ETOH or illicit drug use    Previous Cardiac Diagnostics:   TTE 11/25/22:  There is no evidence of intracardiac shunting. The left ventricle is normal in size with normal systolic function. The estimated ejection fraction is 60-65%. Mild-to- moderate mitral regurgitation. Grade I left ventricular diastolic dysfunction. Mild-to- moderate aortic regurgitation. Mild tricuspid regurgitation.  Normal central venous pressure (3 mmHg). The estimated PA systolic pressure is 37 mmHg.    No current facility-administered medications on file prior to encounter.     Current Outpatient Medications on File Prior to Encounter   Medication Sig    amLODIPine (NORVASC) 10 MG tablet Take 10 mg by mouth once daily. At bedtime    benzonatate (TESSALON) 100 MG capsule Take 100 mg by mouth 2 (two) times a day.    clopidogreL (PLAVIX) 75 mg tablet Take 75 mg by mouth once daily.    furosemide (LASIX) 20 MG tablet Take 20 mg by mouth once daily.    gabapentin (NEURONTIN) 300 MG capsule Take 300 mg by mouth 3 (three) times daily.    LORazepam (ATIVAN) 0.5 MG tablet Take 0.5 mg by mouth every 12 (twelve) hours as needed for Anxiety.    rosuvastatin (CRESTOR) 20 MG tablet Take 20 mg by mouth once daily.         Review of Systems   Respiratory:  Negative for cough and shortness of breath.    Cardiovascular:  Negative for chest pain and palpitations.   Gastrointestinal: Negative.    Genitourinary: Negative.    Musculoskeletal: Negative.    Skin: Negative.    Neurological:  Positive for weakness.   Psychiatric/Behavioral: Negative.       Objective:     Vital Signs (Most Recent):  Temp: 97.6 °F (36.4 °C) (11/26/22 0713)  Pulse: 71 (11/26/22 0713)  Resp: 19 (11/26/22 0713)  BP: 127/70 (11/26/22 0713)  SpO2: (!) 91 % (11/26/22 0713)   Vital Signs (24h Range):  Temp:  [97.6 °F (36.4 °C)-98.2 °F (36.8 °C)] 97.6 °F (36.4 °C)  Pulse:  [70-73] 71  Resp:  [16-20] 19  SpO2:  [91 %-96 %] 91 %  BP: (118-150)/(56-76) 127/70     Weight: 89.8 kg (198 lb)  Body mass index is 31.01 kg/m².    SpO2: (!) 91 %  O2 Device (Oxygen Therapy): room air      Intake/Output Summary (Last 24 hours) at 11/26/2022 0801  Last data filed at 11/26/2022 0500  Gross per 24 hour   Intake 125 ml   Output 1200 ml   Net -1075 ml       Significant Labs:  Recent Results (from the past 72 hour(s))   CBC with Differential    Collection Time: 11/24/22  6:27 PM   Result Value  Ref Range    WBC 5.9 4.5 - 11.5 x10(3)/mcL    RBC 4.37 4.20 - 5.40 x10(6)/mcL    Hgb 13.5 12.0 - 16.0 gm/dL    Hct 40.5 37.0 - 47.0 %    MCV 92.7 80.0 - 94.0 fL    MCH 30.9 27.0 - 31.0 pg    MCHC 33.3 33.0 - 36.0 mg/dL    RDW 13.4 11.5 - 17.0 %    Platelet 186 130 - 400 x10(3)/mcL    MPV 9.9 7.4 - 10.4 fL    Neut % 73.6 %    Lymph % 14.2 %    Mono % 10.8 %    Eos % 0.8 %    Basophil % 0.3 %    Lymph # 0.84 0.6 - 4.6 x10(3)/mcL    Neut # 4.3 2.1 - 9.2 x10(3)/mcL    Mono # 0.64 0.1 - 1.3 x10(3)/mcL    Eos # 0.05 0 - 0.9 x10(3)/mcL    Baso # 0.02 0 - 0.2 x10(3)/mcL    IG# 0.02 0 - 0.04 x10(3)/mcL    IG% 0.3 %    NRBC% 0.0 %   Comprehensive metabolic panel    Collection Time: 11/24/22  6:28 PM   Result Value Ref Range    Sodium Level 138 136 - 145 mmol/L    Potassium Level 3.2 (L) 3.5 - 5.1 mmol/L    Chloride 103 98 - 107 mmol/L    Carbon Dioxide 23 23 - 31 mmol/L    Glucose Level 126 (H) 82 - 115 mg/dL    Blood Urea Nitrogen 16.1 9.8 - 20.1 mg/dL    Creatinine 0.87 0.55 - 1.02 mg/dL    Calcium Level Total 9.0 8.4 - 10.2 mg/dL    Protein Total 7.1 5.8 - 7.6 gm/dL    Albumin Level 3.6 3.4 - 4.8 gm/dL    Globulin 3.5 2.4 - 3.5 gm/dL    Albumin/Globulin Ratio 1.0 (L) 1.1 - 2.0 ratio    Bilirubin Total 0.4 <=1.5 mg/dL    Alkaline Phosphatase 78 40 - 150 unit/L    Alanine Aminotransferase 21 0 - 55 unit/L    Aspartate Aminotransferase 25 5 - 34 unit/L    eGFR >60 mls/min/1.73/m2   Lipid panel    Collection Time: 11/25/22  3:09 AM   Result Value Ref Range    Cholesterol Total 112 <=200 mg/dL    HDL Cholesterol 40 35 - 60 mg/dL    Triglyceride 67 37 - 140 mg/dL    Cholesterol/HDL Ratio 3 0 - 5    Very Low Density Lipoprotein 13     LDL Cholesterol 59.00 50.00 - 140.00 mg/dL   TSH    Collection Time: 11/25/22  3:09 AM   Result Value Ref Range    Thyroid Stimulating Hormone 1.6319 0.3500 - 4.9400 uIU/mL   Sedimentation rate    Collection Time: 11/25/22  3:09 AM   Result Value Ref Range    Sed Rate 43 (H) 0 - 20 mm/hr   High  sensitivity CRP    Collection Time: 11/25/22  3:09 AM   Result Value Ref Range    C-Reactive Protein High Sensitivity 17.81 (H) <=5.00 mg/L   Comprehensive metabolic panel    Collection Time: 11/25/22  3:09 AM   Result Value Ref Range    Sodium Level 138 136 - 145 mmol/L    Potassium Level 3.6 3.5 - 5.1 mmol/L    Chloride 103 98 - 107 mmol/L    Carbon Dioxide 23 23 - 31 mmol/L    Glucose Level 142 (H) 82 - 115 mg/dL    Blood Urea Nitrogen 16.2 9.8 - 20.1 mg/dL    Creatinine 0.74 0.55 - 1.02 mg/dL    Calcium Level Total 8.7 8.4 - 10.2 mg/dL    Protein Total 6.1 5.8 - 7.6 gm/dL    Albumin Level 3.4 3.4 - 4.8 gm/dL    Globulin 2.7 2.4 - 3.5 gm/dL    Albumin/Globulin Ratio 1.3 1.1 - 2.0 ratio    Bilirubin Total 0.4 <=1.5 mg/dL    Alkaline Phosphatase 79 40 - 150 unit/L    Alanine Aminotransferase 26 0 - 55 unit/L    Aspartate Aminotransferase 30 5 - 34 unit/L    eGFR >60 mls/min/1.73/m2   Magnesium    Collection Time: 11/25/22  3:09 AM   Result Value Ref Range    Magnesium Level 1.80 1.60 - 2.60 mg/dL   Phosphorus    Collection Time: 11/25/22  3:09 AM   Result Value Ref Range    Phosphorus Level 3.2 2.3 - 4.7 mg/dL   CBC with Differential    Collection Time: 11/25/22  3:09 AM   Result Value Ref Range    WBC 6.5 4.5 - 11.5 x10(3)/mcL    RBC 4.08 (L) 4.20 - 5.40 x10(6)/mcL    Hgb 12.3 12.0 - 16.0 gm/dL    Hct 37.7 37.0 - 47.0 %    MCV 92.4 80.0 - 94.0 fL    MCH 30.1 27.0 - 31.0 pg    MCHC 32.6 (L) 33.0 - 36.0 mg/dL    RDW 13.5 11.5 - 17.0 %    Platelet 183 130 - 400 x10(3)/mcL    MPV 9.6 7.4 - 10.4 fL    Neut % 70.3 %    Lymph % 15.9 %    Mono % 12.1 %    Eos % 0.9 %    Basophil % 0.3 %    Lymph # 1.03 0.6 - 4.6 x10(3)/mcL    Neut # 4.6 2.1 - 9.2 x10(3)/mcL    Mono # 0.78 0.1 - 1.3 x10(3)/mcL    Eos # 0.06 0 - 0.9 x10(3)/mcL    Baso # 0.02 0 - 0.2 x10(3)/mcL    IG# 0.03 0 - 0.04 x10(3)/mcL    IG% 0.5 %    NRBC% 0.0 %   Echo    Collection Time: 11/25/22  8:56 AM   Result Value Ref Range    BSA 2.06 m2    TDI SEPTAL 0.04  m/s    LV LATERAL E/E' RATIO 15.17 m/s    LV SEPTAL E/E' RATIO 22.75 m/s    Right Atrial Pressure (from IVC) 3 mmHg    EF 60 %    Left Ventricular Outflow Tract Mean Velocity 1.12 cm/s    Left Ventricular Outflow Tract Mean Gradient 6.00 mmHg    TDI LATERAL 0.06 m/s    PV PEAK VELOCITY 1.00 cm/s    LVIDd 4.28 3.5 - 6.0 cm    IVS 0.98 0.6 - 1.1 cm    Posterior Wall 1.10 0.6 - 1.1 cm    LVIDs 2.81 2.1 - 4.0 cm    FS 34 28 - 44 %    LV mass 149.41 g    LA size 3.20 cm    TAPSE 1.83 cm    Left Ventricle Relative Wall Thickness 0.51 cm    AV regurgitation pressure 1/2 time 539 ms    AV mean gradient 7 mmHg    AV valve area 3.21 cm2    AV Velocity Ratio 0.97     AV index (prosthetic) 1.02     MV mean gradient 2 mmHg    MV valve area p 1/2 method 4.49 cm2    MV valve area by continuity eq 3.25 cm2    E/A ratio 0.66     Mean e' 0.05 m/s    E wave deceleration time 322.00 msec    LVOT diameter 2.00 cm    LVOT area 3.1 cm2    LVOT peak peyman 1.68 m/s    LVOT peak VTI 37.70 cm    Ao peak peyman 1.73 m/s    Ao VTI 36.9 cm    LVOT stroke volume 118.38 cm3    AV peak gradient 12 mmHg    MV peak gradient 7 mmHg    TV rest pulmonary artery pressure 37 mmHg    E/E' ratio 18.20 m/s    MV Peak E Peyman 0.91 m/s    AR Max Peyman 4.08 m/s    TR Max Peyman 2.91 m/s    MV VTI 36.4 cm    MV stenosis pressure 1/2 time 49.00 ms    MV Peak A Peyman 1.37 m/s    LV Systolic Volume 29.80 mL    LV Systolic Volume Index 14.8 mL/m2    LV Diastolic Volume 82.20 mL    LV Diastolic Volume Index 40.90 mL/m2    LV Mass Index 74 g/m2    Triscuspid Valve Regurgitation Peak Gradient 34 mmHg    LA Volume Index (Mod) 17.1 mL/m2    LA volume (mod) 34.30 cm3   Basic Metabolic Panel    Collection Time: 11/26/22  6:21 AM   Result Value Ref Range    Sodium Level 137 136 - 145 mmol/L    Potassium Level 3.8 3.5 - 5.1 mmol/L    Chloride 103 98 - 107 mmol/L    Carbon Dioxide 25 23 - 31 mmol/L    Glucose Level 108 82 - 115 mg/dL    Blood Urea Nitrogen 12.5 9.8 - 20.1 mg/dL     Creatinine 0.77 0.55 - 1.02 mg/dL    BUN/Creatinine Ratio 16     Calcium Level Total 8.7 8.4 - 10.2 mg/dL    Anion Gap 9.0 mEq/L    eGFR >60 mls/min/1.73/m2   Magnesium    Collection Time: 11/26/22  6:21 AM   Result Value Ref Range    Magnesium Level 2.00 1.60 - 2.60 mg/dL   Phosphorus    Collection Time: 11/26/22  6:21 AM   Result Value Ref Range    Phosphorus Level 3.4 2.3 - 4.7 mg/dL       Significant Imaging:  Imaging Results              MRI BRAIN WITHOUT CONTRAST (No Result on File)                      CTA Head and Neck (xpd) (Final result)  Result time 11/25/22 09:35:41      Final result by Millie Bee MD (11/25/22 09:35:41)                   Impression:      1. No large vessel occlusion.  2. Atherosclerotic changes of the carotid arteries with moderate focal narrowing of the proximal left common carotid artery and 50-60% stenosis at the right carotid bulb by NASCET criteria.  No significant change from the [facility] interpretation.      Electronically signed by: Millie Bee  Date:    11/25/2022  Time:    09:35               Narrative:    EXAMINATION:  CTA HEAD AND NECK (XPD)    CLINICAL HISTORY:  Stroke/TIA, determine embolic source;    TECHNIQUE:  Axial images obtained through the cervical region and [place] before and after the administration of intravenous contrast.    Coronal, sagittal, MIP and 3D reconstructions were obtained from the axial data.    Automatic exposure control was utilized to limit radiation dose.    Radiation Dose:    Total DLP: 2500 mGy*cm    COMPARISON:  Outside CT head dated 11/24/2022    FINDINGS:  Head CT with contrast:    No interval changes when compared to the previous CT.    No enhancing abnormalities.    If present, stenosis of the carotid bulbs is measured based on NASCET criteria,    i.e. area of maximal stenosis compared to the cervical ICA distal to the bulb.    Cervical CTA:    The origins of the great vessels are patent with moderate scattered  calcifications and moderate focal narrowing of the proximal left common carotid artery.    There are atherosclerotic changes of the common carotid arteries with a stent in place on the left.  There are calcifications at the carotid bulbs with 50-60% stenosis on the right.  The internal carotid arteries are otherwise patent.    The dural venous sinuses are patent.    Intracranial CTA:    There are calcifications along the course of the carotid siphons without hemodynamically significant stenosis.  The middle cerebral arteries and anterior cerebral arteries are patent.    The vertebral arteries are patent with mild scattered calcifications.  The basilar artery and posterior cerebral arteries are patent.    The dural venous sinuses are patent.                        Preliminary result by Millie Bee MD (11/24/22 20:13:52)                   Narrative:    START OF REPORT:  Technique: CT angiogram of the intracranial vessels was performed with intravenous contrast with direct axial as well as sagittal and coronal reformations. CT angiogram of the neck vessels was performed with intravenous contrast with direct axial as well as sagittal and coronal reformations.    Comparison: None.    Clinical history: Right sided weakness.    Findings:  Intracranial Vascular structures:  Internal carotid arteries: Moderate atheromatous calcification of the supraclinoid segment of the bilateral internal carotid artery is seen with mild stenosis.  Middle cerebral arteries: Unremarkable.  Anterior cerebral arteries: Unremarkable.  Vertebral arteries: Unremarkable.  Basilar artery: Unremarkable.  Posterior cerebral arteries: Unremarkable.  Posterior communicating arteries: Unremarkable.  Carotids:  Common carotid arteries: Severe atheromatous calcification of the mid and distal segments of the bilateral common carotid artery is seen with moderate stenosis in the right.  Internal carotid artery: Severe atheromatous calcification with  moderate stenosis at the origin of the right internal carotid artery is seen.  Vertebral arteries: Unremarkable.  CSF spaces: The ventricles sulci and basal cisterns are within normal limits for age.  Brain parenchyma: There is preservation of the grey white junction throughout. Subtle microvascular change is seen in portions of the periventricular and deep white matter tracts.  Infarct: An acute to subacute infarct is seen in the left corona radiata.  Cerebellum: Unremarkable.  Sella and skull base: The sella appears to be within normal limits.  Cerebellopontine angles: Within normal limits.  Herniation: None.  Intracranial calcifications: Incidental note is made of bilateral choroid plexus calcification. Incidental note is made of some pineal region calcification. Incidental note is made of minimal basal ganglia calcifcation.    Miscellaneous: Air fluid levels are seen in the bilateral maxillary sinuses, consistent with sinusitis.      Impression:  1. Moderate atheromatous calcification of the supraclinoid segment of the bilateral internal carotid artery is seen with mild stenosis.  2. Severe atheromatous calcification with moderate stenosis at the origin of the right internal carotid artery is seen.  3. An acute to subacute infarct is seen in the left corona radiata. Recommend MRI correlation for further evaluation.  4. Details and other findings as described above.                          Preliminary result by Lang Bauer MD (11/24/22 20:13:52)                   Narrative:    START OF REPORT:  Technique: CT angiogram of the intracranial vessels was performed with intravenous contrast with direct axial as well as sagittal and coronal reformations. CT angiogram of the neck vessels was performed with intravenous contrast with direct axial as well as sagittal and coronal reformations.    Comparison: None.    Clinical history: Right sided weakness.    Findings:  Intracranial Vascular structures:  Internal  carotid arteries: Moderate atheromatous calcification of the supraclinoid segment of the bilateral internal carotid artery is seen with mild stenosis.  Middle cerebral arteries: Unremarkable.  Anterior cerebral arteries: Unremarkable.  Vertebral arteries: Unremarkable.  Basilar artery: Unremarkable.  Posterior cerebral arteries: Unremarkable.  Posterior communicating arteries: Unremarkable.  Carotids:  Common carotid arteries: Severe atheromatous calcification of the mid and distal segments of the bilateral common carotid artery is seen with moderate stenosis in the right.  Internal carotid artery: Severe atheromatous calcification with moderate stenosis at the origin of the right internal carotid artery is seen.  Vertebral arteries: Unremarkable.  CSF spaces: The ventricles sulci and basal cisterns are within normal limits for age.  Brain parenchyma: There is preservation of the grey white junction throughout. Subtle microvascular change is seen in portions of the periventricular and deep white matter tracts.  Infarct: An acute to subacute infarct is seen in the left corona radiata.  Cerebellum: Unremarkable.  Sella and skull base: The sella appears to be within normal limits.  Cerebellopontine angles: Within normal limits.  Herniation: None.  Intracranial calcifications: Incidental note is made of bilateral choroid plexus calcification. Incidental note is made of some pineal region calcification. Incidental note is made of minimal basal ganglia calcifcation.    Miscellaneous: Air fluid levels are seen in the bilateral maxillary sinuses, consistent with sinusitis.      Impression:  1. Moderate atheromatous calcification of the supraclinoid segment of the bilateral internal carotid artery is seen with mild stenosis.  2. Severe atheromatous calcification with moderate stenosis at the origin of the right internal carotid artery is seen.  3. An acute to subacute infarct is seen in the left corona radiata. Recommend MRI  correlation for further evaluation.  4. Details and other findings as described above.                                        EKG:          Physical Exam    Home Medications:   No current facility-administered medications on file prior to encounter.     Current Outpatient Medications on File Prior to Encounter   Medication Sig Dispense Refill    amLODIPine (NORVASC) 10 MG tablet Take 10 mg by mouth once daily. At bedtime      benzonatate (TESSALON) 100 MG capsule Take 100 mg by mouth 2 (two) times a day.      clopidogreL (PLAVIX) 75 mg tablet Take 75 mg by mouth once daily.      furosemide (LASIX) 20 MG tablet Take 20 mg by mouth once daily.      gabapentin (NEURONTIN) 300 MG capsule Take 300 mg by mouth 3 (three) times daily.      LORazepam (ATIVAN) 0.5 MG tablet Take 0.5 mg by mouth every 12 (twelve) hours as needed for Anxiety.      rosuvastatin (CRESTOR) 20 MG tablet Take 20 mg by mouth once daily.         Current Inpatient Medications:    Current Facility-Administered Medications:     acetaminophen tablet 650 mg, 650 mg, Oral, Q6H PRN, GRICELDA Richardson    aspirin EC tablet 81 mg, 81 mg, Oral, Daily, Nayana Sanon MD, 81 mg at 11/25/22 1342    atorvastatin tablet 80 mg, 80 mg, Oral, Daily, Nayana Sanon MD, 80 mg at 11/25/22 1342    clopidogreL tablet 75 mg, 75 mg, Oral, Daily, Nayana Sanon MD, 75 mg at 11/25/22 1342    docusate sodium capsule 100 mg, 100 mg, Oral, Daily, GRICELDA Richardson    enoxaparin injection 40 mg, 40 mg, Subcutaneous, Daily, GRICELDA Richardson, 40 mg at 11/25/22 1743    gabapentin capsule 300 mg, 300 mg, Oral, TID, Nayana Sanon MD, 300 mg at 11/25/22 2112    labetaloL injection 10 mg, 10 mg, Intravenous, Q2H PRN, GRICELDA Richardson    mupirocin 2 % ointment, , Nasal, BID, Terrance Retana MD    ondansetron injection 4 mg, 4 mg, Intravenous, Q4H PRN, GRICELDA Richardson    oxyCODONE-acetaminophen 5-325 mg per tablet 1 tablet, 1 tablet, Oral, Q6H  PRN, GRICELDA Richardson, 1 tablet at 11/25/22 0112    potassium bicarbonate disintegrating tablet 50 mEq, 50 mEq, Oral, Once, GRICELDA Richardson         VTE Risk Mitigation (From admission, onward)           Ordered     enoxaparin injection 40 mg  Daily         11/25/22 0017     IP VTE HIGH RISK PATIENT  Once         11/25/22 0017     Place sequential compression device  Until discontinued         11/25/22 0017                    Assessment:   Acute to subacute Lacunar infarct  --prior hx CVA x 2 without residual deficit  HTN  HLD  COPD  EF 60-65% with Grade I DD  VHD  --Mild to moderate AR/TR  Hx Lung cancer s/p Left lobectomy/chemo/radiation 2017  Former smoker    Plan:   Continue aspirin, statin, and plavix  Plan Linq placement on Monday. May be done outpatient if patient is to be discharged prior to Monday.   Obtain EKG for baseline  Obtain carotid US    Thank you for your consult.     JEANETH Desai  Cardiology  [facility]  11/26/2022 8:01 AM

## 2022-11-27 PROCEDURE — 25000003 PHARM REV CODE 250: Performed by: PSYCHIATRY & NEUROLOGY

## 2022-11-27 PROCEDURE — 21400001 HC TELEMETRY ROOM

## 2022-11-27 PROCEDURE — 92523 SPEECH SOUND LANG COMPREHEN: CPT

## 2022-11-27 PROCEDURE — 97530 THERAPEUTIC ACTIVITIES: CPT | Mod: CO

## 2022-11-27 PROCEDURE — 25000003 PHARM REV CODE 250: Performed by: NURSE PRACTITIONER

## 2022-11-27 PROCEDURE — 63600175 PHARM REV CODE 636 W HCPCS: Performed by: INTERNAL MEDICINE

## 2022-11-27 PROCEDURE — 25000003 PHARM REV CODE 250: Performed by: INTERNAL MEDICINE

## 2022-11-27 PROCEDURE — 99900031 HC PATIENT EDUCATION (STAT)

## 2022-11-27 PROCEDURE — 25000242 PHARM REV CODE 250 ALT 637 W/ HCPCS: Performed by: INTERNAL MEDICINE

## 2022-11-27 PROCEDURE — 94640 AIRWAY INHALATION TREATMENT: CPT

## 2022-11-27 PROCEDURE — 63600175 PHARM REV CODE 636 W HCPCS: Performed by: NURSE PRACTITIONER

## 2022-11-27 RX ORDER — IPRATROPIUM BROMIDE AND ALBUTEROL SULFATE 2.5; .5 MG/3ML; MG/3ML
3 SOLUTION RESPIRATORY (INHALATION) EVERY 8 HOURS
Status: DISCONTINUED | OUTPATIENT
Start: 2022-11-27 | End: 2022-11-28

## 2022-11-27 RX ORDER — ALPRAZOLAM 0.5 MG/1
0.5 TABLET ORAL 3 TIMES DAILY PRN
Status: DISCONTINUED | OUTPATIENT
Start: 2022-11-27 | End: 2022-11-27

## 2022-11-27 RX ORDER — METHYLPREDNISOLONE SOD SUCC 125 MG
60 VIAL (EA) INJECTION ONCE
Status: COMPLETED | OUTPATIENT
Start: 2022-11-27 | End: 2022-11-27

## 2022-11-27 RX ADMIN — METHYLPREDNISOLONE SODIUM SUCCINATE 60 MG: 125 INJECTION, POWDER, FOR SOLUTION INTRAMUSCULAR; INTRAVENOUS at 11:11

## 2022-11-27 RX ADMIN — GABAPENTIN 300 MG: 300 CAPSULE ORAL at 08:11

## 2022-11-27 RX ADMIN — AMLODIPINE BESYLATE 5 MG: 5 TABLET ORAL at 09:11

## 2022-11-27 RX ADMIN — MUPIROCIN: 20 OINTMENT TOPICAL at 09:11

## 2022-11-27 RX ADMIN — GABAPENTIN 300 MG: 300 CAPSULE ORAL at 09:11

## 2022-11-27 RX ADMIN — IPRATROPIUM BROMIDE AND ALBUTEROL SULFATE 3 ML: 2.5; .5 SOLUTION RESPIRATORY (INHALATION) at 04:11

## 2022-11-27 RX ADMIN — ATORVASTATIN CALCIUM 80 MG: 40 TABLET, FILM COATED ORAL at 08:11

## 2022-11-27 RX ADMIN — MUPIROCIN: 20 OINTMENT TOPICAL at 08:11

## 2022-11-27 RX ADMIN — ENOXAPARIN SODIUM 40 MG: 40 INJECTION SUBCUTANEOUS at 04:11

## 2022-11-27 RX ADMIN — AMLODIPINE BESYLATE 5 MG: 5 TABLET ORAL at 08:11

## 2022-11-27 RX ADMIN — GABAPENTIN 300 MG: 300 CAPSULE ORAL at 04:11

## 2022-11-27 RX ADMIN — ASPIRIN 325 MG: 325 TABLET, COATED ORAL at 08:11

## 2022-11-27 RX ADMIN — DOCUSATE SODIUM 100 MG: 100 CAPSULE, LIQUID FILLED ORAL at 08:11

## 2022-11-27 RX ADMIN — OXYCODONE HYDROCHLORIDE AND ACETAMINOPHEN 1 TABLET: 5; 325 TABLET ORAL at 04:11

## 2022-11-27 RX ADMIN — CLOPIDOGREL BISULFATE 75 MG: 75 TABLET ORAL at 08:11

## 2022-11-27 NOTE — PROGRESS NOTES
[facility] Hospital Medicine Progress Note        Chief Complaint: Inpatient Follow-up for  acute ischemia stroke     HPI:   Ms. Bermudez is a 71 year old female with PMH as below who presented to the ED as a transfer from [facility] for neuro services.  She originally presented to the ED with complaints of  headache and right arm and leg weakness/numbness.  CT head performed there negative.  There are conflicting stories about onset of symptoms, whether it was 3:00 this afternoon or starting yesterday.  Regardless, family declined tPA and she was transferred to [facility].      ED lab work performed here showed mild hypokalemia, otherwise unremarkable.  CTA head and neck showed moderate atheromatous calcification of the supraclinoid segment of the bilateral internal carotid artery is seen with mild stenosis.  Severe atheromatous calcification with moderate stenosis at the origin of the right internal carotid artery seen.  An acute to subacute infarct is seen in the left corona radiata.  Neurology was consulted in the ED.  The patient was admitted to Hospital Medicine for management.     11/25-Pt is fully awake , alert, oriented x3, speech is clear and fluent. Remains with Rt sided weakness with muscle power RUE 1/5, RLE 3/5, Left U/L Ext 5/5. MRI brain attempted x 2 , but pt was unable to undergo due to severe anxiety and claustrophobia. Ativan IV was tried. SLP cleared pt for regular diet. PT/OT to see pt.  Continue ASA, Plavix , statin , permissive HTN x 24h. Cardiology consulted to evaluate for LINQ placement.     11/26-Cardiology consult obtained and possible LINQ placement on Monday. Neuro suggested to increase ASA to 325 mg. Continue Plavix and high intensity statin. PT/OT suggested Rehab placement.         Interval Hx:   Remains with Rt sided weakness with MS 2/5 Rt U/L ext. LINQ placement scheduled for tomorrow per Cards. PT/TO suggested rehab placement. CM consult to assist with DC  planning.     Objective/physical exam:    General: In no acute distress, afebrile  Chest: Clear to auscultation bilaterally  Heart: RRR, +S1, S2, no appreciable murmur  Abdomen: Soft, nontender, BS +  MSK: Warm, no lower extremity edema, no clubbing or cyanosis  Neurologic: Alert and oriented x4, Cranial nerve II-XII intact, Strength 2/5 RUE, 2/5, RLE,5/5 left U/L ext.   VITAL SIGNS: 24 HRS MIN & MAX LAST   Temp  Min: 97.6 °F (36.4 °C)  Max: 98.9 °F (37.2 °C) 97.8 °F (36.6 °C)   BP  Min: 117/71  Max: 157/70 (!) 157/70     Pulse  Min: 68  Max: 78  78   Resp  Min: 16  Max: 20 16   SpO2  Min: 91 %  Max: 95 % (!) 92 %         Recent Labs   Lab 11/24/22 1827 11/25/22  0309   WBC 5.9 6.5   RBC 4.37 4.08*   HGB 13.5 12.3   HCT 40.5 37.7   MCV 92.7 92.4   MCH 30.9 30.1   MCHC 33.3 32.6*   RDW 13.4 13.5    183   MPV 9.9 9.6       Recent Labs   Lab 11/24/22 1828 11/25/22  0309 11/26/22  0621    138 137   K 3.2* 3.6 3.8   CO2 23 23 25   BUN 16.1 16.2 12.5   CREATININE 0.87 0.74 0.77   CALCIUM 9.0 8.7 8.7   MG  --  1.80 2.00   ALBUMIN 3.6 3.4  --    ALKPHOS 78 79  --    ALT 21 26  --    AST 25 30  --    BILITOT 0.4 0.4  --           Microbiology Results (last 7 days)       ** No results found for the last 168 hours. **             See below for Radiology    Scheduled Med:   albuterol-ipratropium  3 mL Nebulization Q8H    amLODIPine  5 mg Oral BID    aspirin  325 mg Oral Daily    atorvastatin  80 mg Oral Daily    clopidogreL  75 mg Oral Daily    docusate sodium  100 mg Oral Daily    enoxaparin  40 mg Subcutaneous Daily    gabapentin  300 mg Oral TID    mupirocin   Nasal BID        Continuous Infusions:       PRN Meds:  acetaminophen, labetalol, lorazepam, ondansetron, oxyCODONE-acetaminophen       Assessment/Plan:  Acute Ischemic stroke - CTA head showed an acute to subacute infarct is seen in the left corona radiata.  Prior H/O CVA x 2 without residual deficit   Essential HTN  Hyperlipidemia   COPD without acute  exacerbation - continue inhaled bronchodilators   Former smoker   H/O Lung cancer Ca s/p Left lobectomy, chemo, radiation in 2017     Plan-   ASA increased to 325 mg daily per Neuro recs   Continue plavix , high intensity statin   Permissive HTN x 24h   MRI brain attempted x 2, however pt was not able to undergo due to severe anxiety and claustrophobia   PT/OT/SLP eval   Cardiology consult for LINQ placement   Neurology is following     VTE prophylaxis: Lovenox     Patient condition:  Fair.     Anticipated discharge and Disposition:         All diagnosis and differential diagnosis have been reviewed; assessment and plan has been documented; I have personally reviewed the labs and test results that are presently available; I have reviewed the patients medication list; I have reviewed the consulting providers response and recommendations. I have reviewed or attempted to review medical records based upon their availability    All of the patient's questions have been  addressed and answered. Patient's is agreeable to the above stated plan. I will continue to monitor closely and make adjustments to medical management as needed.  _____________________________________________________________________    Nutrition Status:    Radiology:  CV Ultrasound Bilateral Doppler Carotid  The left internal carotid artery demonstrated less than 50% stenosis.   Patent left distal common carotid artery stent.  The right internal carotid artery demonstrated 50-69% stenosis.  The bilateral vertebral arteries were patent with antegrade flow.      Leo Villalta MD   11/27/2022

## 2022-11-27 NOTE — PT/OT/SLP PROGRESS
Occupational Therapy   Treatment    Name: Darrion Bermudez  MRN: [id number]  Admitting Diagnosis:  Acute to subacute ischemic stroke    Recommendations:     Discharge Recommendations: rehabilitation facility  Discharge Equipment Recommendations:  TBD  Barriers to discharge:   (severity of deficits)    Assessment:     Darrion Bermudez is a 71 y.o. female with a medical diagnosis of <principal problem not specified>.  She presents with  Right hemiparesis. Performance deficits affecting function are weakness, impaired endurance, impaired self care skills, impaired functional mobility, impaired balance, gait instability, decreased coordination, decreased upper extremity function.     Rehab Prognosis:  Good; patient would benefit from acute skilled OT services to address these deficits and reach maximum level of function.       Plan:     Patient to be seen daily to address the above listed problems via self-care/home management, therapeutic activities, therapeutic exercises, neuromuscular re-education  Plan of Care Expires:    Plan of Care Reviewed with: patient, daughter    Subjective     Pain/Comfort:  Pain Rating 1: 0/10    Objective:     Communicated with: RN prior to session.  Patient found HOB elevated with telemetry upon OT entry to room.    General Precautions: Standard, fall   Orthopedic Precautions:    Braces: N/A  Respiratory Status: Room air     Occupational Performance:     Bed Mobility:    Patient completed Supine to Sit with minimum assistance  Patient completed Sit to Supine with moderate assistance     Functional Mobility/Transfers:  Patient completed Sit <> Stand Transfer with moderate assistance x 3 attempts and block R knee to prevent buckling with hand-held assist   Functional Mobility: Mod A    Activities of Daily Living:  Lower Body Dressing: maximal assistance d/t increased difficulty with bogdan dressing techniques and UE coordination.      Treatment & Education:  Educated Pt on bogdan dressing  techniques and POC.    Patient left HOB elevated with call button in reach and family present    GOALS:   Multidisciplinary Problems       Occupational Therapy Goals          Problem: Occupational Therapy    Goal Priority Disciplines Outcome Interventions   Occupational Therapy Goal     OT, PT/OT Ongoing, Progressing    Description: Pt will t/f bed/chair <>BSC min A  Pt will increase R UE  strength to 3/5.  Pt will complete UE dressing min A  Pt will complete grooming sitting at sink SBA  Pt will increase static standing with CGA with hemiwalker.                       Time Tracking:     OT Date of Treatment: 11/27/22  OT Start Time: 1154  OT Stop Time: 1215  OT Total Time (min): 21 min    Billable Minutes:Self Care/Home Management 21          DYLAN Visit Number: 1    11/27/2022

## 2022-11-27 NOTE — PT/OT/SLP EVAL
Speech Language Pathology Department  Cognitive-Communication Evaluation    Patient Name:  Darrion Bermudez   MRN:  [id number]  Admitting Diagnosis: <principal problem not specified>    Recommendations:     General recommendations:  SLP intervention not indicated  Communication strategies:  none    Discharge recommendations:  Discharge Facility/Level of Care Needs: rehabilitation facility   Barriers to safe discharge: acuity of illness    History:     No past medical history on file.    No past surgical history on file.    Previous level of Function  Education:high school  Occupation: retired  Lives: alone    Subjective     Patient awake, alert, oriented x4, and cooperative.    Patient goals: not stated     Spiritual/Cultural/Sikhism Beliefs/Practices that affect care: no    Pain/Comfort: Pain Rating 1: 0/10    Respiratory Status: room air     Objective:     SPEECH PRODUCTION  Phoneme Production: WFL  Voice Quality: WFL  Voice Production: WFL  Speech Rate: WFL  Loudness: WFL  Respiration: WFL  Resonance: WFL  Prosody: WFL  Speech Intelligibility  Known Context: greater than 90%  Unknown Context: greater than 90%    AUDITORY COMPREHENSION  Identification:  Body parts: 100%  Following Directions:  1-Step: 100%  2-Step: 100%  3-Step: 100%  Yes/No Questions:  Biographical: 100%  Environmental: 100%  Simple: 100%  Complex: 100%    VERBAL EXPRESSION  Automatic Speech:  Functional needs: WFL  Days of the week: WFL  Months of the year: WFL  Repetition:  Bi-syllabic words: WFL  Multi-syllabic words: WFL  Phrase Completion: 100%  Confrontation Naming  Objects: 100%  Wh- Questions:  Object name: 100%  Object function: 100%  Complex: 100%    COGNITION  Orientation:  Person: [facility]  Place: [facility]  Time: WFL  Situation: WFL  Attention:  Sustained: WFL  Pragmatics:  Communicative Intent: WFL  Completeness: WFL  Memory:  Delayed: WFL  Short Term: WFL  Problem Solving  Functional simple: WFL  Functional complex:  WFL  Organization:  Convergent thinking: WFL  Divergent thinking: WFL  Executive Function:  Cognitive endurance: WFL  Information processing: WFL    Assessment:     Pt presents with functional speech, language, and cognitive-communication skills for safe, independent living and PLOF. No further skilled SLP services are warranted at this time. SLP to sign off, please reconsult as needed.     Patient Education:     Patient and family provided with verbal education regarding SLCE results.  Understanding was verbalized.    Plan:     Plan of Care reviewed with:  patient, family   SLP Follow-Up:  No       Time Tracking:     SLP Treatment Date:   11/27/22  Speech Start Time:  1220  Speech Stop Time:  1240     Speech Total Time (min):  20 min    Billable minutes:  Evaluation of Speech Sound Production with Comprehension and Expression, 20 minutes      11/27/2022

## 2022-11-27 NOTE — PROGRESS NOTES
[facility] - [facility] Neuro  Cardiology  Progress Note    Patient Name: Darrion Bermudez  MRN: [id number]  Admission Date: 11/24/2022  Hospital Length of Stay: 3 days  Code Status: Full Code   Attending Provider: Nayana Sanon MD   Consulting Provider: JEANETH Desai  Primary Care Physician: No primary care provider on file.  Principal Problem:<principal problem not specified>    Patient information was obtained from patient, past medical records, and ER records.     Subjective:     Chief Complaint:   right sided weakness, headache    HPI:   Ms. Bermudez is a 72 y/o female, unknown to [facility], with PMHx significant for CVA, lung cancer/lobectomy, and HTN who presented to [place] ED with c/o Right sided weakness and headache pain, onset 6-12hr PTA. CTA head negative. She was transferred to [facility] for neurology services. CTA head/neck negative LVO but does reveal p LCCA focal narrowing and 50-60% stenosis Right carotid bulb. An acute to subacute infarct is seen in left corona radiata. MRI Pending. Neurology has been consulted and is requesting LINQ placement. Echo reveals normal EF with G1DD. No bubble study completed. Patient has been in SR since admit    Hospital Course:  11/27/22: Patient unable to undergo MRI due to severe anxiety/claustrophobia. Neuro has increased ASA to 325 mg daily with plavix and atorvastatin. Plans for loop recorder on Monday. SR per tele review    PMH: ICH, ROMANA/LCEA/stent, COPD, HTN, HLD, anxiety, lung cancer/lobectomy/ chemo and radiation 2017, CVA x 2 without residual deficit  PSH: Left CEA; L Carotid stent x 2, left lobectomy, right leg stent, back surgery x 2  Family History: none  Social History: former smoker, Denies ETOH or illicit drug use    Previous Cardiac Diagnostics:   CUS 11/26/22:  LICA < 50% stenosis. Patent left distal CCA  QUE 50-69% stenosis  Bilateral vertebral arteries patent with antegrade flow.    TTE 11/25/22:  There is no evidence of intracardiac  shunting. The left ventricle is normal in size with normal systolic function. The estimated ejection fraction is 60-65%. Mild-to- moderate mitral regurgitation. Grade I left ventricular diastolic dysfunction. Mild-to- moderate aortic regurgitation. Mild tricuspid regurgitation. Normal central venous pressure (3 mmHg). The estimated PA systolic pressure is 37 mmHg.    No current facility-administered medications on file prior to encounter.     Current Outpatient Medications on File Prior to Encounter   Medication Sig    amLODIPine (NORVASC) 10 MG tablet Take 10 mg by mouth once daily. At bedtime    benzonatate (TESSALON) 100 MG capsule Take 100 mg by mouth 2 (two) times a day.    clopidogreL (PLAVIX) 75 mg tablet Take 75 mg by mouth once daily.    furosemide (LASIX) 20 MG tablet Take 20 mg by mouth once daily.    gabapentin (NEURONTIN) 300 MG capsule Take 300 mg by mouth 3 (three) times daily.    LORazepam (ATIVAN) 0.5 MG tablet Take 0.5 mg by mouth every 12 (twelve) hours as needed for Anxiety.    rosuvastatin (CRESTOR) 20 MG tablet Take 20 mg by mouth once daily.       Review of Systems   Respiratory:  Negative for cough and shortness of breath.    Cardiovascular:  Negative for chest pain and palpitations.   Gastrointestinal: Negative.    Genitourinary: Negative.    Musculoskeletal: Negative.    Skin: Negative.    Neurological:  Positive for weakness.   Psychiatric/Behavioral: Negative.       Objective:     Vital Signs (Most Recent):  Temp: 98.6 °F (37 °C) (11/27/22 0751)  Pulse: 73 (11/27/22 0751)  Resp: 18 (11/27/22 0751)  BP: (!) 152/75 (11/27/22 0751)  SpO2: (!) 92 % (11/27/22 0751)   Vital Signs (24h Range):  Temp:  [97.6 °F (36.4 °C)-98.9 °F (37.2 °C)] 98.6 °F (37 °C)  Pulse:  [68-75] 73  Resp:  [18-20] 18  SpO2:  [91 %-95 %] 92 %  BP: (117-152)/(71-80) 152/75     Weight: 89.8 kg (198 lb)  Body mass index is 31.01 kg/m².    SpO2: (!) 92 %  O2 Device (Oxygen Therapy): room air      Intake/Output Summary (Last  24 hours) at 11/27/2022 1105  Last data filed at 11/27/2022 0510  Gross per 24 hour   Intake 120 ml   Output 2050 ml   Net -1930 ml         Significant Labs:  Recent Results (from the past 72 hour(s))   CBC with Differential    Collection Time: 11/24/22  6:27 PM   Result Value Ref Range    WBC 5.9 4.5 - 11.5 x10(3)/mcL    RBC 4.37 4.20 - 5.40 x10(6)/mcL    Hgb 13.5 12.0 - 16.0 gm/dL    Hct 40.5 37.0 - 47.0 %    MCV 92.7 80.0 - 94.0 fL    MCH 30.9 27.0 - 31.0 pg    MCHC 33.3 33.0 - 36.0 mg/dL    RDW 13.4 11.5 - 17.0 %    Platelet 186 130 - 400 x10(3)/mcL    MPV 9.9 7.4 - 10.4 fL    Neut % 73.6 %    Lymph % 14.2 %    Mono % 10.8 %    Eos % 0.8 %    Basophil % 0.3 %    Lymph # 0.84 0.6 - 4.6 x10(3)/mcL    Neut # 4.3 2.1 - 9.2 x10(3)/mcL    Mono # 0.64 0.1 - 1.3 x10(3)/mcL    Eos # 0.05 0 - 0.9 x10(3)/mcL    Baso # 0.02 0 - 0.2 x10(3)/mcL    IG# 0.02 0 - 0.04 x10(3)/mcL    IG% 0.3 %    NRBC% 0.0 %   Comprehensive metabolic panel    Collection Time: 11/24/22  6:28 PM   Result Value Ref Range    Sodium Level 138 136 - 145 mmol/L    Potassium Level 3.2 (L) 3.5 - 5.1 mmol/L    Chloride 103 98 - 107 mmol/L    Carbon Dioxide 23 23 - 31 mmol/L    Glucose Level 126 (H) 82 - 115 mg/dL    Blood Urea Nitrogen 16.1 9.8 - 20.1 mg/dL    Creatinine 0.87 0.55 - 1.02 mg/dL    Calcium Level Total 9.0 8.4 - 10.2 mg/dL    Protein Total 7.1 5.8 - 7.6 gm/dL    Albumin Level 3.6 3.4 - 4.8 gm/dL    Globulin 3.5 2.4 - 3.5 gm/dL    Albumin/Globulin Ratio 1.0 (L) 1.1 - 2.0 ratio    Bilirubin Total 0.4 <=1.5 mg/dL    Alkaline Phosphatase 78 40 - 150 unit/L    Alanine Aminotransferase 21 0 - 55 unit/L    Aspartate Aminotransferase 25 5 - 34 unit/L    eGFR >60 mls/min/1.73/m2   Lipid panel    Collection Time: 11/25/22  3:09 AM   Result Value Ref Range    Cholesterol Total 112 <=200 mg/dL    HDL Cholesterol 40 35 - 60 mg/dL    Triglyceride 67 37 - 140 mg/dL    Cholesterol/HDL Ratio 3 0 - 5    Very Low Density Lipoprotein 13     LDL Cholesterol 59.00  50.00 - 140.00 mg/dL   TSH    Collection Time: 11/25/22  3:09 AM   Result Value Ref Range    Thyroid Stimulating Hormone 1.6319 0.3500 - 4.9400 uIU/mL   Sedimentation rate    Collection Time: 11/25/22  3:09 AM   Result Value Ref Range    Sed Rate 43 (H) 0 - 20 mm/hr   High sensitivity CRP    Collection Time: 11/25/22  3:09 AM   Result Value Ref Range    C-Reactive Protein High Sensitivity 17.81 (H) <=5.00 mg/L   Comprehensive metabolic panel    Collection Time: 11/25/22  3:09 AM   Result Value Ref Range    Sodium Level 138 136 - 145 mmol/L    Potassium Level 3.6 3.5 - 5.1 mmol/L    Chloride 103 98 - 107 mmol/L    Carbon Dioxide 23 23 - 31 mmol/L    Glucose Level 142 (H) 82 - 115 mg/dL    Blood Urea Nitrogen 16.2 9.8 - 20.1 mg/dL    Creatinine 0.74 0.55 - 1.02 mg/dL    Calcium Level Total 8.7 8.4 - 10.2 mg/dL    Protein Total 6.1 5.8 - 7.6 gm/dL    Albumin Level 3.4 3.4 - 4.8 gm/dL    Globulin 2.7 2.4 - 3.5 gm/dL    Albumin/Globulin Ratio 1.3 1.1 - 2.0 ratio    Bilirubin Total 0.4 <=1.5 mg/dL    Alkaline Phosphatase 79 40 - 150 unit/L    Alanine Aminotransferase 26 0 - 55 unit/L    Aspartate Aminotransferase 30 5 - 34 unit/L    eGFR >60 mls/min/1.73/m2   Magnesium    Collection Time: 11/25/22  3:09 AM   Result Value Ref Range    Magnesium Level 1.80 1.60 - 2.60 mg/dL   Phosphorus    Collection Time: 11/25/22  3:09 AM   Result Value Ref Range    Phosphorus Level 3.2 2.3 - 4.7 mg/dL   CBC with Differential    Collection Time: 11/25/22  3:09 AM   Result Value Ref Range    WBC 6.5 4.5 - 11.5 x10(3)/mcL    RBC 4.08 (L) 4.20 - 5.40 x10(6)/mcL    Hgb 12.3 12.0 - 16.0 gm/dL    Hct 37.7 37.0 - 47.0 %    MCV 92.4 80.0 - 94.0 fL    MCH 30.1 27.0 - 31.0 pg    MCHC 32.6 (L) 33.0 - 36.0 mg/dL    RDW 13.5 11.5 - 17.0 %    Platelet 183 130 - 400 x10(3)/mcL    MPV 9.6 7.4 - 10.4 fL    Neut % 70.3 %    Lymph % 15.9 %    Mono % 12.1 %    Eos % 0.9 %    Basophil % 0.3 %    Lymph # 1.03 0.6 - 4.6 x10(3)/mcL    Neut # 4.6 2.1 - 9.2  x10(3)/mcL    Mono # 0.78 0.1 - 1.3 x10(3)/mcL    Eos # 0.06 0 - 0.9 x10(3)/mcL    Baso # 0.02 0 - 0.2 x10(3)/mcL    IG# 0.03 0 - 0.04 x10(3)/mcL    IG% 0.5 %    NRBC% 0.0 %   Echo    Collection Time: 11/25/22  8:56 AM   Result Value Ref Range    BSA 2.06 m2    TDI SEPTAL 0.04 m/s    LV LATERAL E/E' RATIO 15.17 m/s    LV SEPTAL E/E' RATIO 22.75 m/s    Right Atrial Pressure (from IVC) 3 mmHg    EF 60 %    Left Ventricular Outflow Tract Mean Velocity 1.12 cm/s    Left Ventricular Outflow Tract Mean Gradient 6.00 mmHg    TDI LATERAL 0.06 m/s    PV PEAK VELOCITY 1.00 cm/s    LVIDd 4.28 3.5 - 6.0 cm    IVS 0.98 0.6 - 1.1 cm    Posterior Wall 1.10 0.6 - 1.1 cm    LVIDs 2.81 2.1 - 4.0 cm    FS 34 28 - 44 %    LV mass 149.41 g    LA size 3.20 cm    TAPSE 1.83 cm    Left Ventricle Relative Wall Thickness 0.51 cm    AV regurgitation pressure 1/2 time 539 ms    AV mean gradient 7 mmHg    AV valve area 3.21 cm2    AV Velocity Ratio 0.97     AV index (prosthetic) 1.02     MV mean gradient 2 mmHg    MV valve area p 1/2 method 4.49 cm2    MV valve area by continuity eq 3.25 cm2    E/A ratio 0.66     Mean e' 0.05 m/s    E wave deceleration time 322.00 msec    LVOT diameter 2.00 cm    LVOT area 3.1 cm2    LVOT peak peyman 1.68 m/s    LVOT peak VTI 37.70 cm    Ao peak peyman 1.73 m/s    Ao VTI 36.9 cm    LVOT stroke volume 118.38 cm3    AV peak gradient 12 mmHg    MV peak gradient 7 mmHg    TV rest pulmonary artery pressure 37 mmHg    E/E' ratio 18.20 m/s    MV Peak E Peyman 0.91 m/s    AR Max Peyman 4.08 m/s    TR Max Peyman 2.91 m/s    MV VTI 36.4 cm    MV stenosis pressure 1/2 time 49.00 ms    MV Peak A Peyman 1.37 m/s    LV Systolic Volume 29.80 mL    LV Systolic Volume Index 14.8 mL/m2    LV Diastolic Volume 82.20 mL    LV Diastolic Volume Index 40.90 mL/m2    LV Mass Index 74 g/m2    Triscuspid Valve Regurgitation Peak Gradient 34 mmHg    LA Volume Index (Mod) 17.1 mL/m2    LA volume (mod) 34.30 cm3   Basic Metabolic Panel    Collection Time:  11/26/22  6:21 AM   Result Value Ref Range    Sodium Level 137 136 - 145 mmol/L    Potassium Level 3.8 3.5 - 5.1 mmol/L    Chloride 103 98 - 107 mmol/L    Carbon Dioxide 25 23 - 31 mmol/L    Glucose Level 108 82 - 115 mg/dL    Blood Urea Nitrogen 12.5 9.8 - 20.1 mg/dL    Creatinine 0.77 0.55 - 1.02 mg/dL    BUN/Creatinine Ratio 16     Calcium Level Total 8.7 8.4 - 10.2 mg/dL    Anion Gap 9.0 mEq/L    eGFR >60 mls/min/1.73/m2   Magnesium    Collection Time: 11/26/22  6:21 AM   Result Value Ref Range    Magnesium Level 2.00 1.60 - 2.60 mg/dL   Phosphorus    Collection Time: 11/26/22  6:21 AM   Result Value Ref Range    Phosphorus Level 3.4 2.3 - 4.7 mg/dL   CV Ultrasound Bilateral Doppler Carotid    Collection Time: 11/26/22 10:41 AM   Result Value Ref Range    Left ICA/CCA ratio 0.65     Right ICA/CCA ratio 3.11     Left Highest ICA 91.00     Left Highest      Right Highest .00     Right Highest CCA 66     Right Highest EDV 46.00     LT Highest EDV 21.00     Right CCA prox sys 50 cm/s    Right CCA dist sys 66 cm/s    Right ICA prox sys 205 cm/s    Right ICA prox rangel 46 cm/s    Right ICA mid sys 202 cm/s    Right ICA mid rangel 42 cm/s    Right ICA dist sys 154 cm/s    Right ICA dist rangel 28 cm/s    Right ECA sys 121 cm/s    Right vertebral sys 50 cm/s    Left CCA prox sys 149 cm/s    Left CCA dist sys 139 cm/s    Left ICA prox sys 91 cm/s    Left ICA prox rangel 19 cm/s    Left ICA mid sys 67 cm/s    Left ICA mid rangel 18 cm/s    Left ICA dist sys 71 cm/s    Left ICA dist rangel 21 cm/s    Left ECA sys 167 cm/s    Left vertebral sys 78 cm/s   Hemoglobin A1C    Collection Time: 11/26/22 10:42 AM   Result Value Ref Range    Hemoglobin A1c 6.4 <=7.0 %    Estimated Average Glucose 137.0 mg/dL       Significant Imaging:  Imaging Results              MRI BRAIN WITHOUT CONTRAST (No Result on File)                      CTA Head and Neck (xpd) (Final result)  Result time 11/25/22 09:35:41      Final result by Millie  JOVANI Bee MD (11/25/22 09:35:41)                   Impression:      1. No large vessel occlusion.  2. Atherosclerotic changes of the carotid arteries with moderate focal narrowing of the proximal left common carotid artery and 50-60% stenosis at the right carotid bulb by NASCET criteria.  No significant change from the [facility] interpretation.      Electronically signed by: Millie Bee  Date:    11/25/2022  Time:    09:35               Narrative:    EXAMINATION:  CTA HEAD AND NECK (XPD)    CLINICAL HISTORY:  Stroke/TIA, determine embolic source;    TECHNIQUE:  Axial images obtained through the cervical region and [place] of [place] before and after the administration of intravenous contrast.    Coronal, sagittal, MIP and 3D reconstructions were obtained from the axial data.    Automatic exposure control was utilized to limit radiation dose.    Radiation Dose:    Total DLP: 2500 mGy*cm    COMPARISON:  Outside CT head dated 11/24/2022    FINDINGS:  Head CT with contrast:    No interval changes when compared to the previous CT.    No enhancing abnormalities.    If present, stenosis of the carotid bulbs is measured based on NASCET criteria,    i.e. area of maximal stenosis compared to the cervical ICA distal to the bulb.    Cervical CTA:    The origins of the great vessels are patent with moderate scattered calcifications and moderate focal narrowing of the proximal left common carotid artery.    There are atherosclerotic changes of the common carotid arteries with a stent in place on the left.  There are calcifications at the carotid bulbs with 50-60% stenosis on the right.  The internal carotid arteries are otherwise patent.    The dural venous sinuses are patent.    Intracranial CTA:    There are calcifications along the course of the carotid siphons without hemodynamically significant stenosis.  The middle cerebral arteries and anterior cerebral arteries are patent.    The vertebral arteries are patent with  mild scattered calcifications.  The basilar artery and posterior cerebral arteries are patent.    The dural venous sinuses are patent.                        Preliminary result by Millie Bee MD (11/24/22 20:13:52)                   Narrative:    START OF REPORT:  Technique: CT angiogram of the intracranial vessels was performed with intravenous contrast with direct axial as well as sagittal and coronal reformations. CT angiogram of the neck vessels was performed with intravenous contrast with direct axial as well as sagittal and coronal reformations.    Comparison: None.    Clinical history: Right sided weakness.    Findings:  Intracranial Vascular structures:  Internal carotid arteries: Moderate atheromatous calcification of the supraclinoid segment of the bilateral internal carotid artery is seen with mild stenosis.  Middle cerebral arteries: Unremarkable.  Anterior cerebral arteries: Unremarkable.  Vertebral arteries: Unremarkable.  Basilar artery: Unremarkable.  Posterior cerebral arteries: Unremarkable.  Posterior communicating arteries: Unremarkable.  Carotids:  Common carotid arteries: Severe atheromatous calcification of the mid and distal segments of the bilateral common carotid artery is seen with moderate stenosis in the right.  Internal carotid artery: Severe atheromatous calcification with moderate stenosis at the origin of the right internal carotid artery is seen.  Vertebral arteries: Unremarkable.  CSF spaces: The ventricles sulci and basal cisterns are within normal limits for age.  Brain parenchyma: There is preservation of the grey white junction throughout. Subtle microvascular change is seen in portions of the periventricular and deep white matter tracts.  Infarct: An acute to subacute infarct is seen in the left corona radiata.  Cerebellum: Unremarkable.  Sella and skull base: The sella appears to be within normal limits.  Cerebellopontine angles: Within normal limits.  Herniation:  None.  Intracranial calcifications: Incidental note is made of bilateral choroid plexus calcification. Incidental note is made of some pineal region calcification. Incidental note is made of minimal basal ganglia calcifcation.    Miscellaneous: Air fluid levels are seen in the bilateral maxillary sinuses, consistent with sinusitis.      Impression:  1. Moderate atheromatous calcification of the supraclinoid segment of the bilateral internal carotid artery is seen with mild stenosis.  2. Severe atheromatous calcification with moderate stenosis at the origin of the right internal carotid artery is seen.  3. An acute to subacute infarct is seen in the left corona radiata. Recommend MRI correlation for further evaluation.  4. Details and other findings as described above.                          Preliminary result by Lang Bauer MD (11/24/22 20:13:52)                   Narrative:    START OF REPORT:  Technique: CT angiogram of the intracranial vessels was performed with intravenous contrast with direct axial as well as sagittal and coronal reformations. CT angiogram of the neck vessels was performed with intravenous contrast with direct axial as well as sagittal and coronal reformations.    Comparison: None.    Clinical history: Right sided weakness.    Findings:  Intracranial Vascular structures:  Internal carotid arteries: Moderate atheromatous calcification of the supraclinoid segment of the bilateral internal carotid artery is seen with mild stenosis.  Middle cerebral arteries: Unremarkable.  Anterior cerebral arteries: Unremarkable.  Vertebral arteries: Unremarkable.  Basilar artery: Unremarkable.  Posterior cerebral arteries: Unremarkable.  Posterior communicating arteries: Unremarkable.  Carotids:  Common carotid arteries: Severe atheromatous calcification of the mid and distal segments of the bilateral common carotid artery is seen with moderate stenosis in the right.  Internal carotid artery: Severe  atheromatous calcification with moderate stenosis at the origin of the right internal carotid artery is seen.  Vertebral arteries: Unremarkable.  CSF spaces: The ventricles sulci and basal cisterns are within normal limits for age.  Brain parenchyma: There is preservation of the grey white junction throughout. Subtle microvascular change is seen in portions of the periventricular and deep white matter tracts.  Infarct: An acute to subacute infarct is seen in the left corona radiata.  Cerebellum: Unremarkable.  Sella and skull base: The sella appears to be within normal limits.  Cerebellopontine angles: Within normal limits.  Herniation: None.  Intracranial calcifications: Incidental note is made of bilateral choroid plexus calcification. Incidental note is made of some pineal region calcification. Incidental note is made of minimal basal ganglia calcifcation.    Miscellaneous: Air fluid levels are seen in the bilateral maxillary sinuses, consistent with sinusitis.      Impression:  1. Moderate atheromatous calcification of the supraclinoid segment of the bilateral internal carotid artery is seen with mild stenosis.  2. Severe atheromatous calcification with moderate stenosis at the origin of the right internal carotid artery is seen.  3. An acute to subacute infarct is seen in the left corona radiata. Recommend MRI correlation for further evaluation.  4. Details and other findings as described above.                                        Telemetry:  SR    Physical Exam  HENT:      Mouth/Throat:      Mouth: Mucous membranes are moist.   Cardiovascular:      Rate and Rhythm: Normal rate and regular rhythm.      Pulses: Normal pulses.      Heart sounds: Normal heart sounds.   Pulmonary:      Effort: Pulmonary effort is normal.      Breath sounds: Normal breath sounds.   Abdominal:      Palpations: Abdomen is soft.   Musculoskeletal:         General: Normal range of motion.   Skin:     General: Skin is warm.       Capillary Refill: Capillary refill takes less than 2 seconds.   Neurological:      Mental Status: She is alert.      Motor: Weakness present.       Home Medications:   No current facility-administered medications on file prior to encounter.     Current Outpatient Medications on File Prior to Encounter   Medication Sig Dispense Refill    amLODIPine (NORVASC) 10 MG tablet Take 10 mg by mouth once daily. At bedtime      benzonatate (TESSALON) 100 MG capsule Take 100 mg by mouth 2 (two) times a day.      clopidogreL (PLAVIX) 75 mg tablet Take 75 mg by mouth once daily.      furosemide (LASIX) 20 MG tablet Take 20 mg by mouth once daily.      gabapentin (NEURONTIN) 300 MG capsule Take 300 mg by mouth 3 (three) times daily.      LORazepam (ATIVAN) 0.5 MG tablet Take 0.5 mg by mouth every 12 (twelve) hours as needed for Anxiety.      rosuvastatin (CRESTOR) 20 MG tablet Take 20 mg by mouth once daily.         Current Inpatient Medications:    Current Facility-Administered Medications:     acetaminophen tablet 650 mg, 650 mg, Oral, Q6H PRN, GRICELDA Richardson    amLODIPine tablet 5 mg, 5 mg, Oral, BID, eLo Villalta MD, 5 mg at 11/27/22 0810    aspirin EC tablet 325 mg, 325 mg, Oral, Daily, Baron Chaney MD, 325 mg at 11/27/22 0816    atorvastatin tablet 80 mg, 80 mg, Oral, Daily, Nayana Sanon MD, 80 mg at 11/27/22 0811    clopidogreL tablet 75 mg, 75 mg, Oral, Daily, Nayana Sanon MD, 75 mg at 11/27/22 0810    docusate sodium capsule 100 mg, 100 mg, Oral, Daily, JAN RichardsonBC, 100 mg at 11/27/22 0810    enoxaparin injection 40 mg, 40 mg, Subcutaneous, Daily, BARTOLO Richardson-BC, 40 mg at 11/26/22 1635    gabapentin capsule 300 mg, 300 mg, Oral, TID, Nayana Sanon MD, 300 mg at 11/27/22 0810    labetaloL injection 10 mg, 10 mg, Intravenous, Q2H PRN, GRICELDA Richardson    mupirocin 2 % ointment, , Nasal, BID, Terrance Retana MD, Given at 11/27/22 0811    ondansetron injection 4 mg,  4 mg, Intravenous, Q4H PRN, Elisha Kerns, AGACNP-BC    oxyCODONE-acetaminophen 5-325 mg per tablet 1 tablet, 1 tablet, Oral, Q6H PRN, BARTOLO Richardson-BC, 1 tablet at 11/26/22 2021         VTE Risk Mitigation (From admission, onward)           Ordered     enoxaparin injection 40 mg  Daily         11/25/22 0017     IP VTE HIGH RISK PATIENT  Once         11/25/22 0017     Place sequential compression device  Until discontinued         11/25/22 0017                    Assessment:   Acute to subacute Lacunar infarct  --prior hx CVA x 2 without residual deficit  B - ROMANA  --LICA < 50% stenosis  --QUE 50-69% stenosis  HTN  HLD  COPD  EF 60-65% with Grade I DD  VHD  --Mild to moderate AR/TR  Hx Lung cancer s/p Left lobectomy/chemo/radiation 2017  Former smoker    Plan:   Continue aspirin, statin, and plavix. No need to increase ASA to 325 mg as patient does not derive any increased benefit from higher dosage.   Plan Linq placement on Monday. Consent obtained and placed on chart.   Monitor CUS outpatient. No interventions needed presently. Continue aspirin and statin.       JEANETH Desai  Cardiology  [facility] - [facility]  11/27/2022 8:01 AM

## 2022-11-27 NOTE — PLAN OF CARE
Problem: Adult Inpatient Plan of Care  Goal: Plan of Care Review  Outcome: Ongoing, Progressing  Goal: Patient-Specific Goal (Individualized)  Outcome: Ongoing, Progressing  Goal: Absence of Hospital-Acquired Illness or Injury  Outcome: Ongoing, Progressing  Goal: Optimal Comfort and Wellbeing  Outcome: Ongoing, Progressing  Goal: Readiness for Transition of Care  Outcome: Ongoing, Progressing     Problem: Adjustment to Illness (Stroke, Ischemic/Transient Ischemic Attack)  Goal: Optimal Coping  Outcome: Ongoing, Progressing     Problem: Bowel Elimination Impaired (Stroke, Ischemic/Transient Ischemic Attack)  Goal: Effective Bowel Elimination  Outcome: Ongoing, Progressing     Problem: Cerebral Tissue Perfusion (Stroke, Ischemic/Transient Ischemic Attack)  Goal: Optimal Cerebral Tissue Perfusion  Outcome: Ongoing, Progressing     Problem: Cognitive Impairment (Stroke, Ischemic/Transient Ischemic Attack)  Goal: Optimal Cognitive Function  Outcome: Ongoing, Progressing     Problem: Communication Impairment (Stroke, Ischemic/Transient Ischemic Attack)  Goal: Improved Communication Skills  Outcome: Ongoing, Progressing     Problem: Functional Ability Impaired (Stroke, Ischemic/Transient Ischemic Attack)  Goal: Optimal Functional Ability  Outcome: Ongoing, Progressing     Problem: Respiratory Compromise (Stroke, Ischemic/Transient Ischemic Attack)  Goal: Effective Oxygenation and Ventilation  Outcome: Ongoing, Progressing     Problem: Sensorimotor Impairment (Stroke, Ischemic/Transient Ischemic Attack)  Goal: Improved Sensorimotor Function  Outcome: Ongoing, Progressing     Problem: Swallowing Impairment (Stroke, Ischemic/Transient Ischemic Attack)  Goal: Optimal Eating and Swallowing without Aspiration  Outcome: Ongoing, Progressing     Problem: Urinary Elimination Impaired (Stroke, Ischemic/Transient Ischemic Attack)  Goal: Effective Urinary Elimination  Outcome: Ongoing, Progressing     Problem: Skin Injury Risk  Increased  Goal: Skin Health and Integrity  Outcome: Ongoing, Progressing     Problem: Fall Injury Risk  Goal: Absence of Fall and Fall-Related Injury  Outcome: Ongoing, Progressing

## 2022-11-28 PROBLEM — I63.9 ACUTE ISCHEMIC STROKE: Status: ACTIVE | Noted: 2022-11-28

## 2022-11-28 PROCEDURE — 25000003 PHARM REV CODE 250: Performed by: INTERNAL MEDICINE

## 2022-11-28 PROCEDURE — 33285 INSJ SUBQ CAR RHYTHM MNTR: CPT | Performed by: INTERNAL MEDICINE

## 2022-11-28 PROCEDURE — 25000003 PHARM REV CODE 250: Performed by: NURSE PRACTITIONER

## 2022-11-28 PROCEDURE — 25000003 PHARM REV CODE 250: Performed by: PSYCHIATRY & NEUROLOGY

## 2022-11-28 PROCEDURE — 21400001 HC TELEMETRY ROOM

## 2022-11-28 PROCEDURE — 63600175 PHARM REV CODE 636 W HCPCS: Performed by: INTERNAL MEDICINE

## 2022-11-28 PROCEDURE — C1764 EVENT RECORDER, CARDIAC: HCPCS | Performed by: INTERNAL MEDICINE

## 2022-11-28 DEVICE — SYS LINQ ICM MONITOR: Type: IMPLANTABLE DEVICE | Site: CHEST | Status: FUNCTIONAL

## 2022-11-28 RX ORDER — AMLODIPINE BESYLATE 5 MG/1
10 TABLET ORAL DAILY
Status: DISCONTINUED | OUTPATIENT
Start: 2022-11-28 | End: 2022-12-01 | Stop reason: HOSPADM

## 2022-11-28 RX ORDER — PANTOPRAZOLE SODIUM 40 MG/1
40 TABLET, DELAYED RELEASE ORAL DAILY
Status: DISCONTINUED | OUTPATIENT
Start: 2022-11-28 | End: 2022-12-01 | Stop reason: HOSPADM

## 2022-11-28 RX ORDER — REVEFENACIN 175 UG/3ML
3 SOLUTION RESPIRATORY (INHALATION) DAILY
COMMUNITY
Start: 2022-08-09

## 2022-11-28 RX ORDER — LIDOCAINE HYDROCHLORIDE 10 MG/ML
INJECTION INFILTRATION; PERINEURAL
Status: DISCONTINUED | OUTPATIENT
Start: 2022-11-28 | End: 2022-12-01 | Stop reason: HOSPADM

## 2022-11-28 RX ORDER — LEVALBUTEROL INHALATION SOLUTION 0.63 MG/3ML
0.63 SOLUTION RESPIRATORY (INHALATION) EVERY 8 HOURS PRN
Status: DISCONTINUED | OUTPATIENT
Start: 2022-11-28 | End: 2022-12-01 | Stop reason: HOSPADM

## 2022-11-28 RX ORDER — CALCIUM CARBONATE 200(500)MG
500 TABLET,CHEWABLE ORAL 3 TIMES DAILY PRN
Status: DISCONTINUED | OUTPATIENT
Start: 2022-11-28 | End: 2022-12-01 | Stop reason: HOSPADM

## 2022-11-28 RX ADMIN — AMLODIPINE BESYLATE 10 MG: 5 TABLET ORAL at 09:11

## 2022-11-28 RX ADMIN — DOCUSATE SODIUM 100 MG: 100 CAPSULE, LIQUID FILLED ORAL at 09:11

## 2022-11-28 RX ADMIN — GABAPENTIN 300 MG: 300 CAPSULE ORAL at 04:11

## 2022-11-28 RX ADMIN — CALCIUM CARBONATE (ANTACID) CHEW TAB 500 MG 500 MG: 500 CHEW TAB at 09:11

## 2022-11-28 RX ADMIN — ATORVASTATIN CALCIUM 80 MG: 40 TABLET, FILM COATED ORAL at 09:11

## 2022-11-28 RX ADMIN — CALCIUM CARBONATE (ANTACID) CHEW TAB 500 MG 500 MG: 500 CHEW TAB at 08:11

## 2022-11-28 RX ADMIN — MUPIROCIN: 20 OINTMENT TOPICAL at 08:11

## 2022-11-28 RX ADMIN — GABAPENTIN 300 MG: 300 CAPSULE ORAL at 08:11

## 2022-11-28 RX ADMIN — PANTOPRAZOLE SODIUM 40 MG: 40 TABLET, DELAYED RELEASE ORAL at 09:11

## 2022-11-28 RX ADMIN — ASPIRIN 325 MG: 325 TABLET, COATED ORAL at 09:11

## 2022-11-28 RX ADMIN — OXYCODONE HYDROCHLORIDE AND ACETAMINOPHEN 1 TABLET: 5; 325 TABLET ORAL at 01:11

## 2022-11-28 RX ADMIN — GABAPENTIN 300 MG: 300 CAPSULE ORAL at 09:11

## 2022-11-28 RX ADMIN — LORAZEPAM 1 MG: 2 INJECTION INTRAMUSCULAR; INTRAVENOUS at 05:11

## 2022-11-28 RX ADMIN — OXYCODONE HYDROCHLORIDE AND ACETAMINOPHEN 1 TABLET: 5; 325 TABLET ORAL at 08:11

## 2022-11-28 NOTE — PT/OT/SLP PROGRESS
Physical Therapy      Patient Name:  Darrion Bermudez   MRN:  [id number]    Patient not seen today secondary to going for linq procedure. Will follow-up tomorrow.

## 2022-11-28 NOTE — PT/OT/SLP PROGRESS
Patient not seen today secondary off floor for LINQ procedure. OT to follow-up tomorrow as appropriate.

## 2022-11-28 NOTE — CONSULTS
"Inpatient Nutrition Evaluation    Admit Date: 11/24/2022   Total duration of encounter: 3 days    Nutrition Recommendation/Prescription     Continue heart healthy diet as tolerated     Nutrition Assessment     Chart Review    Reason Seen: physician consult "Diet" - no further explanation, was unsuccessful in gaining clarity on what was wanted     Diagnosis:  Acute Ischemic stroke - CTA head showed an acute to subacute infarct is seen in the left corona radiata.  Prior H/O CVA x 2 without residual deficit   Essential HTN  Hyperlipidemia   COPD without acute exacerbation - continue inhaled bronchodilators   Former smoker   H/O Lung cancer Ca s/p Left lobectomy, chemo, radiation in 2017    Relevant Medical History:   COPD  Hypertension  Hyperlipidemia  Anxiety  Lung Ca s/p Left lobectomy, chemo, radiation in 2017  CVA x 2 without residual deficit    Nutrition-Related Medications: Aspirin, Atorvastatin, Docusate     Nutrition-Related Labs:  11/26: Labs WNL    Diet Order: Diet heart healthy  Oral Supplement Order: none  Appetite/Oral Intake: good/% of meals  Factors Affecting Nutritional Intake: none identified  Food/[place]/Cultural Preferences: none reported  Food Allergies: none reported       Wound(s):   n/a    Comments    11/27/22: Unsure what was desired by consulting us. Pt reports very good appetite, no GI complaints, no unintentional weight loss, chewing/swallowing well. We discussed heart healthy diet, she has recently made positive diet changes that are in-line with cardiac diet.     Anthropometrics    Height: 5' 7" (170.2 cm) Height Method: Stated  Last Weight: 89.8 kg (198 lb) (11/25/22 0718) Weight Method: Bed Scale  BMI (Calculated): 31  BMI Classification: obese grade I (BMI 30-34.9)     Ideal Body Weight (IBW), Female: 135 lb     % Ideal Body Weight, Female (lb): 146.67 %                             Usual Weight Provided By: patient    Wt Readings from Last 5 Encounters:   11/25/22 89.8 kg (198 " lb)     Weight Change(s) Since Admission:  Admit Weight: 89.8 kg (198 lb) (11/24/22 2346)      Patient Education    Education Provided: heart healthy diet  Teaching Method: explanation  Comprehension: verbalizes understanding  Barriers to Learning: none evident  Expected Compliance: good  Comments: All questions were answered and dietitian's contact information was provided.     Monitoring & Evaluation     Dietitian will monitor food and beverage intake and weight.  Nutrition Risk/Follow-Up: low (follow-up in 5-7 days)  Patients assigned 'low nutrition risk' status do not qualify for a full nutritional assessment but will be monitored and re-evaluated in a 5-7 day time period. Please consult if re-evaluation needed sooner.

## 2022-11-28 NOTE — PROGRESS NOTES
[facility]  Cardiology  Progress Note    Patient Name: Darrion Bermudez  MRN: [id number]  Admission Date: 11/24/2022  Hospital Length of Stay: 4 days  Code Status: Full Code   Attending Provider: Nayana Sanon MD   Consulting Provider: JEANETH Desai  Primary Care Physician: No primary care provider on file.  Principal Problem:Acute ischemic stroke    Patient information was obtained from patient, past medical records, and ER records.     Subjective:     Chief Complaint:   right sided weakness, headache    HPI:   Ms. Bermudez is a 70 y/o female, unknown to [facility], with PMHx significant for CVA, lung cancer/lobectomy, and HTN who presented to [place] ED with c/o Right sided weakness and headache pain, onset 6-12hr PTA. CTA head negative. She was transferred to [facility] for neurology services. CTA head/neck negative LVO but does reveal p LCCA focal narrowing and 50-60% stenosis Right carotid bulb. An acute to subacute infarct is seen in left corona radiata. MRI Pending. Neurology has been consulted and is requesting LINQ placement. Echo reveals normal EF with G1DD. No bubble study completed. Patient has been in SR since admit    Hospital Course:  11/27/22: Patient unable to undergo MRI due to severe anxiety/claustrophobia. Neuro has increased ASA to 325 mg daily with plavix and atorvastatin. Plans for loop recorder on Monday. SR per tele review  11/28/22: Patient awake in bed. NAD. VSS. Plans for LINQ placement today    PMH: ICH, ROMANA/LCEA/stent, COPD, HTN, HLD, anxiety, lung cancer/lobectomy/ chemo and radiation 2017, CVA x 2 without residual deficit  PSH: Left CEA; L Carotid stent x 2, left lobectomy, right leg stent, back surgery x 2  Family History: none  Social History: former smoker, Denies ETOH or illicit drug use    Previous Cardiac Diagnostics:   CUS 11/26/22:  LICA < 50% stenosis. Patent left distal CCA  QUE 50-69% stenosis  Bilateral vertebral arteries patent with antegrade  flow.    TTE 11/25/22:  There is no evidence of intracardiac shunting. The left ventricle is normal in size with normal systolic function. The estimated ejection fraction is 60-65%. Mild-to- moderate mitral regurgitation. Grade I left ventricular diastolic dysfunction. Mild-to- moderate aortic regurgitation. Mild tricuspid regurgitation. Normal central venous pressure (3 mmHg). The estimated PA systolic pressure is 37 mmHg.    No current facility-administered medications on file prior to encounter.     Current Outpatient Medications on File Prior to Encounter   Medication Sig    YUPELRI 175 mcg/3 mL Nebu Take 3 mLs by nebulization once daily.    amLODIPine (NORVASC) 10 MG tablet Take 10 mg by mouth once daily. At bedtime    benzonatate (TESSALON) 100 MG capsule Take 100 mg by mouth 2 (two) times a day.    clopidogreL (PLAVIX) 75 mg tablet Take 75 mg by mouth once daily.    furosemide (LASIX) 20 MG tablet Take 20 mg by mouth once daily.    gabapentin (NEURONTIN) 300 MG capsule Take 300 mg by mouth 3 (three) times daily.    LORazepam (ATIVAN) 0.5 MG tablet Take 0.5 mg by mouth every 12 (twelve) hours as needed for Anxiety.    rosuvastatin (CRESTOR) 20 MG tablet Take 20 mg by mouth once daily.       Review of Systems   Respiratory:  Negative for cough and shortness of breath.    Cardiovascular:  Negative for chest pain and palpitations.   Gastrointestinal: Negative.    Genitourinary: Negative.    Musculoskeletal: Negative.    Skin: Negative.    Neurological:  Positive for weakness.   Psychiatric/Behavioral: Negative.       Objective:     Vital Signs (Most Recent):  Temp: 98.3 °F (36.8 °C) (11/28/22 1109)  Pulse: 65 (11/28/22 1109)  Resp: 18 (11/28/22 1109)  BP: (!) 156/76 (11/28/22 1109)  SpO2: (!) 91 % (11/28/22 1109)   Vital Signs (24h Range):  Temp:  [97.8 °F (36.6 °C)-98.3 °F (36.8 °C)] 98.3 °F (36.8 °C)  Pulse:  [64-85] 65  Resp:  [16-20] 18  SpO2:  [91 %-96 %] 91 %  BP: (134-163)/(59-77) 156/76     Weight: 89.8  kg (198 lb)  Body mass index is 31.01 kg/m².    SpO2: (!) 91 %  O2 Device (Oxygen Therapy): room air      Intake/Output Summary (Last 24 hours) at 11/28/2022 1316  Last data filed at 11/27/2022 1800  Gross per 24 hour   Intake 240 ml   Output 600 ml   Net -360 ml         Significant Labs:  Recent Results (from the past 72 hour(s))   Basic Metabolic Panel    Collection Time: 11/26/22  6:21 AM   Result Value Ref Range    Sodium Level 137 136 - 145 mmol/L    Potassium Level 3.8 3.5 - 5.1 mmol/L    Chloride 103 98 - 107 mmol/L    Carbon Dioxide 25 23 - 31 mmol/L    Glucose Level 108 82 - 115 mg/dL    Blood Urea Nitrogen 12.5 9.8 - 20.1 mg/dL    Creatinine 0.77 0.55 - 1.02 mg/dL    BUN/Creatinine Ratio 16     Calcium Level Total 8.7 8.4 - 10.2 mg/dL    Anion Gap 9.0 mEq/L    eGFR >60 mls/min/1.73/m2   Magnesium    Collection Time: 11/26/22  6:21 AM   Result Value Ref Range    Magnesium Level 2.00 1.60 - 2.60 mg/dL   Phosphorus    Collection Time: 11/26/22  6:21 AM   Result Value Ref Range    Phosphorus Level 3.4 2.3 - 4.7 mg/dL   CV Ultrasound Bilateral Doppler Carotid    Collection Time: 11/26/22 10:41 AM   Result Value Ref Range    Left ICA/CCA ratio 0.65     Right ICA/CCA ratio 3.11     Left Highest ICA 91.00     Left Highest      Right Highest .00     Right Highest CCA 66     Right Highest EDV 46.00     LT Highest EDV 21.00     Right CCA prox sys 50 cm/s    Right CCA dist sys 66 cm/s    Right ICA prox sys 205 cm/s    Right ICA prox rangel 46 cm/s    Right ICA mid sys 202 cm/s    Right ICA mid rangel 42 cm/s    Right ICA dist sys 154 cm/s    Right ICA dist rangel 28 cm/s    Right ECA sys 121 cm/s    Right vertebral sys 50 cm/s    Left CCA prox sys 149 cm/s    Left CCA dist sys 139 cm/s    Left ICA prox sys 91 cm/s    Left ICA prox rangel 19 cm/s    Left ICA mid sys 67 cm/s    Left ICA mid rangel 18 cm/s    Left ICA dist sys 71 cm/s    Left ICA dist rangel 21 cm/s    Left ECA sys 167 cm/s    Left vertebral sys 78  cm/s   Hemoglobin A1C    Collection Time: 11/26/22 10:42 AM   Result Value Ref Range    Hemoglobin A1c 6.4 <=7.0 %    Estimated Average Glucose 137.0 mg/dL       Significant Imaging:  Imaging Results              CTA Head and Neck (xpd) (Final result)  Result time 11/25/22 09:35:41      Final result by Millie Bee MD (11/25/22 09:35:41)                   Impression:      1. No large vessel occlusion.  2. Atherosclerotic changes of the carotid arteries with moderate focal narrowing of the proximal left common carotid artery and 50-60% stenosis at the right carotid bulb by NASCET criteria.  No significant change from the [facility] interpretation.      Electronically signed by: Millie Bee  Date:    11/25/2022  Time:    09:35               Narrative:    EXAMINATION:  CTA HEAD AND NECK (XPD)    CLINICAL HISTORY:  Stroke/TIA, determine embolic source;    TECHNIQUE:  Axial images obtained through the cervical region and [place] before and after the administration of intravenous contrast.    Coronal, sagittal, MIP and 3D reconstructions were obtained from the axial data.    Automatic exposure control was utilized to limit radiation dose.    Radiation Dose:    Total DLP: 2500 mGy*cm    COMPARISON:  Outside CT head dated 11/24/2022    FINDINGS:  Head CT with contrast:    No interval changes when compared to the previous CT.    No enhancing abnormalities.    If present, stenosis of the carotid bulbs is measured based on NASCET criteria,    i.e. area of maximal stenosis compared to the cervical ICA distal to the bulb.    Cervical CTA:    The origins of the great vessels are patent with moderate scattered calcifications and moderate focal narrowing of the proximal left common carotid artery.    There are atherosclerotic changes of the common carotid arteries with a stent in place on the left.  There are calcifications at the carotid bulbs with 50-60% stenosis on the right.  The internal carotid arteries  are otherwise patent.    The dural venous sinuses are patent.    Intracranial CTA:    There are calcifications along the course of the carotid siphons without hemodynamically significant stenosis.  The middle cerebral arteries and anterior cerebral arteries are patent.    The vertebral arteries are patent with mild scattered calcifications.  The basilar artery and posterior cerebral arteries are patent.    The dural venous sinuses are patent.                        Preliminary result by Millie Bee MD (11/24/22 20:13:52)                   Narrative:    START OF REPORT:  Technique: CT angiogram of the intracranial vessels was performed with intravenous contrast with direct axial as well as sagittal and coronal reformations. CT angiogram of the neck vessels was performed with intravenous contrast with direct axial as well as sagittal and coronal reformations.    Comparison: None.    Clinical history: Right sided weakness.    Findings:  Intracranial Vascular structures:  Internal carotid arteries: Moderate atheromatous calcification of the supraclinoid segment of the bilateral internal carotid artery is seen with mild stenosis.  Middle cerebral arteries: Unremarkable.  Anterior cerebral arteries: Unremarkable.  Vertebral arteries: Unremarkable.  Basilar artery: Unremarkable.  Posterior cerebral arteries: Unremarkable.  Posterior communicating arteries: Unremarkable.  Carotids:  Common carotid arteries: Severe atheromatous calcification of the mid and distal segments of the bilateral common carotid artery is seen with moderate stenosis in the right.  Internal carotid artery: Severe atheromatous calcification with moderate stenosis at the origin of the right internal carotid artery is seen.  Vertebral arteries: Unremarkable.  CSF spaces: The ventricles sulci and basal cisterns are within normal limits for age.  Brain parenchyma: There is preservation of the grey white junction throughout. Subtle microvascular  change is seen in portions of the periventricular and deep white matter tracts.  Infarct: An acute to subacute infarct is seen in the left corona radiata.  Cerebellum: Unremarkable.  Sella and skull base: The sella appears to be within normal limits.  Cerebellopontine angles: Within normal limits.  Herniation: None.  Intracranial calcifications: Incidental note is made of bilateral choroid plexus calcification. Incidental note is made of some pineal region calcification. Incidental note is made of minimal basal ganglia calcifcation.    Miscellaneous: Air fluid levels are seen in the bilateral maxillary sinuses, consistent with sinusitis.      Impression:  1. Moderate atheromatous calcification of the supraclinoid segment of the bilateral internal carotid artery is seen with mild stenosis.  2. Severe atheromatous calcification with moderate stenosis at the origin of the right internal carotid artery is seen.  3. An acute to subacute infarct is seen in the left corona radiata. Recommend MRI correlation for further evaluation.  4. Details and other findings as described above.                          Preliminary result by Lang Bauer MD (11/24/22 20:13:52)                   Narrative:    START OF REPORT:  Technique: CT angiogram of the intracranial vessels was performed with intravenous contrast with direct axial as well as sagittal and coronal reformations. CT angiogram of the neck vessels was performed with intravenous contrast with direct axial as well as sagittal and coronal reformations.    Comparison: None.    Clinical history: Right sided weakness.    Findings:  Intracranial Vascular structures:  Internal carotid arteries: Moderate atheromatous calcification of the supraclinoid segment of the bilateral internal carotid artery is seen with mild stenosis.  Middle cerebral arteries: Unremarkable.  Anterior cerebral arteries: Unremarkable.  Vertebral arteries: Unremarkable.  Basilar artery:  Unremarkable.  Posterior cerebral arteries: Unremarkable.  Posterior communicating arteries: Unremarkable.  Carotids:  Common carotid arteries: Severe atheromatous calcification of the mid and distal segments of the bilateral common carotid artery is seen with moderate stenosis in the right.  Internal carotid artery: Severe atheromatous calcification with moderate stenosis at the origin of the right internal carotid artery is seen.  Vertebral arteries: Unremarkable.  CSF spaces: The ventricles sulci and basal cisterns are within normal limits for age.  Brain parenchyma: There is preservation of the grey white junction throughout. Subtle microvascular change is seen in portions of the periventricular and deep white matter tracts.  Infarct: An acute to subacute infarct is seen in the left corona radiata.  Cerebellum: Unremarkable.  Sella and skull base: The sella appears to be within normal limits.  Cerebellopontine angles: Within normal limits.  Herniation: None.  Intracranial calcifications: Incidental note is made of bilateral choroid plexus calcification. Incidental note is made of some pineal region calcification. Incidental note is made of minimal basal ganglia calcifcation.    Miscellaneous: Air fluid levels are seen in the bilateral maxillary sinuses, consistent with sinusitis.      Impression:  1. Moderate atheromatous calcification of the supraclinoid segment of the bilateral internal carotid artery is seen with mild stenosis.  2. Severe atheromatous calcification with moderate stenosis at the origin of the right internal carotid artery is seen.  3. An acute to subacute infarct is seen in the left corona radiata. Recommend MRI correlation for further evaluation.  4. Details and other findings as described above.                                        Telemetry:  SR    Physical Exam  HENT:      Mouth/Throat:      Mouth: Mucous membranes are moist.   Cardiovascular:      Rate and Rhythm: Normal rate and  regular rhythm.      Pulses: Normal pulses.      Heart sounds: Normal heart sounds.   Pulmonary:      Effort: Pulmonary effort is normal.      Breath sounds: Normal breath sounds.   Abdominal:      Palpations: Abdomen is soft.   Musculoskeletal:         General: Normal range of motion.   Skin:     General: Skin is warm.      Capillary Refill: Capillary refill takes less than 2 seconds.   Neurological:      Mental Status: She is alert.      Motor: Weakness present.       Home Medications:   No current facility-administered medications on file prior to encounter.     Current Outpatient Medications on File Prior to Encounter   Medication Sig Dispense Refill    YUPELRI 175 mcg/3 mL Nebu Take 3 mLs by nebulization once daily.      amLODIPine (NORVASC) 10 MG tablet Take 10 mg by mouth once daily. At bedtime      benzonatate (TESSALON) 100 MG capsule Take 100 mg by mouth 2 (two) times a day.      clopidogreL (PLAVIX) 75 mg tablet Take 75 mg by mouth once daily.      furosemide (LASIX) 20 MG tablet Take 20 mg by mouth once daily.      gabapentin (NEURONTIN) 300 MG capsule Take 300 mg by mouth 3 (three) times daily.      LORazepam (ATIVAN) 0.5 MG tablet Take 0.5 mg by mouth every 12 (twelve) hours as needed for Anxiety.      rosuvastatin (CRESTOR) 20 MG tablet Take 20 mg by mouth once daily.         Current Inpatient Medications:    Current Facility-Administered Medications:     acetaminophen tablet 650 mg, 650 mg, Oral, Q6H PRN, Elisha Kerns, [facility]    amLODIPine tablet 10 mg, 10 mg, Oral, Daily, Leo Villalta MD, 10 mg at 11/28/22 0905    aspirin EC tablet 325 mg, 325 mg, Oral, Daily, Baron Chaney MD, 325 mg at 11/28/22 0905    atorvastatin tablet 80 mg, 80 mg, Oral, Daily, Nayana Sanon MD, 80 mg at 11/28/22 0905    calcium carbonate 200 mg calcium (500 mg) chewable tablet 500 mg, 500 mg, Oral, TID PRN, Leo Villalta MD, 500 mg at 11/28/22 0905    clopidogreL tablet 75 mg, 75 mg, Oral, Daily, Nayana Sanon MD,  75 mg at 11/27/22 0810    docusate sodium capsule 100 mg, 100 mg, Oral, Daily, Elisha Kerns, AGACNP-BC, 100 mg at 11/28/22 0905    enoxaparin injection 40 mg, 40 mg, Subcutaneous, Daily, Elisha Kerns, AGACNP-BC, 40 mg at 11/27/22 1611    gabapentin capsule 300 mg, 300 mg, Oral, TID, Nayana Sanon MD, 300 mg at 11/28/22 0905    labetaloL injection 10 mg, 10 mg, Intravenous, Q2H PRN, Elisha Kerns AGACNP-BC    LORazepam (ATIVAN) injection 1 mg, 1 mg, Intravenous, Q12H PRN, Leo Villalta MD    mupirocin 2 % ointment, , Nasal, BID, Terrance Retana MD, Given at 11/27/22 2125    ondansetron injection 4 mg, 4 mg, Intravenous, Q4H PRN, Elisha Kerns AGACNP-BC    oxyCODONE-acetaminophen 5-325 mg per tablet 1 tablet, 1 tablet, Oral, Q6H PRN, Elisha Kerns, AGACNP-BC, 1 tablet at 11/27/22 1611    pantoprazole EC tablet 40 mg, 40 mg, Oral, Daily, Nayana Sanon MD, 40 mg at 11/28/22 0905         VTE Risk Mitigation (From admission, onward)           Ordered     enoxaparin injection 40 mg  Daily         11/25/22 0017     IP VTE HIGH RISK PATIENT  Once         11/25/22 0017     Place sequential compression device  Until discontinued         11/25/22 0017                    Assessment:   Acute to subacute Lacunar infarct  --prior hx CVA x 2 without residual deficit  B - ROMANA  --LICA < 50% stenosis  --QUE 50-69% stenosis  HTN  HLD  COPD  EF 60-65% with Grade I DD  VHD  --Mild to moderate AR/TR  Hx Lung cancer s/p Left lobectomy/chemo/radiation 2017  Former smoker    Plan:   Continue aspirin, statin, and plavix. No need to increase ASA to 325 mg as patient does not derive any increased benefit from higher dosage.   Plan Linq placement today. Consent obtained and placed on chart.   Monitor CUS outpatient. No interventions needed presently. Continue aspirin and statin.     [facility] will sign off post LINQ. Reconsult if needed.     Litzy Padilla, JEANETH  Cardiology  [facility]  11/28/2022  8:01 AM

## 2022-11-29 LAB
ALBUMIN SERPL-MCNC: 3.4 GM/DL (ref 3.4–4.8)
ALBUMIN/GLOB SERPL: 1 RATIO (ref 1.1–2)
ALP SERPL-CCNC: 71 UNIT/L (ref 40–150)
ALT SERPL-CCNC: 23 UNIT/L (ref 0–55)
AST SERPL-CCNC: 23 UNIT/L (ref 5–34)
BASOPHILS # BLD AUTO: 0.04 X10(3)/MCL (ref 0–0.2)
BASOPHILS NFR BLD AUTO: 0.4 %
BILIRUBIN DIRECT+TOT PNL SERPL-MCNC: 0.3 MG/DL
BUN SERPL-MCNC: 19.4 MG/DL (ref 9.8–20.1)
CALCIUM SERPL-MCNC: 9.8 MG/DL (ref 8.4–10.2)
CHLORIDE SERPL-SCNC: 103 MMOL/L (ref 98–107)
CO2 SERPL-SCNC: 24 MMOL/L (ref 23–31)
CREAT SERPL-MCNC: 0.77 MG/DL (ref 0.55–1.02)
EOSINOPHIL # BLD AUTO: 0.11 X10(3)/MCL (ref 0–0.9)
EOSINOPHIL NFR BLD AUTO: 1.1 %
ERYTHROCYTE [DISTWIDTH] IN BLOOD BY AUTOMATED COUNT: 13.7 % (ref 11.5–17)
GFR SERPLBLD CREATININE-BSD FMLA CKD-EPI: >60 MLS/MIN/1.73/M2
GLOBULIN SER-MCNC: 3.3 GM/DL (ref 2.4–3.5)
GLUCOSE SERPL-MCNC: 100 MG/DL (ref 82–115)
HCT VFR BLD AUTO: 40.8 % (ref 37–47)
HGB BLD-MCNC: 13.2 GM/DL (ref 12–16)
IMM GRANULOCYTES # BLD AUTO: 0.06 X10(3)/MCL (ref 0–0.04)
IMM GRANULOCYTES NFR BLD AUTO: 0.6 %
LYMPHOCYTES # BLD AUTO: 2.31 X10(3)/MCL (ref 0.6–4.6)
LYMPHOCYTES NFR BLD AUTO: 22.2 %
MAGNESIUM SERPL-MCNC: 1.9 MG/DL (ref 1.6–2.6)
MCH RBC QN AUTO: 30.9 PG (ref 27–31)
MCHC RBC AUTO-ENTMCNC: 32.4 MG/DL (ref 33–36)
MCV RBC AUTO: 95.6 FL (ref 80–94)
MONOCYTES # BLD AUTO: 0.59 X10(3)/MCL (ref 0.1–1.3)
MONOCYTES NFR BLD AUTO: 5.7 %
NEUTROPHILS # BLD AUTO: 7.3 X10(3)/MCL (ref 2.1–9.2)
NEUTROPHILS NFR BLD AUTO: 70 %
NRBC BLD AUTO-RTO: 0 %
PHOSPHATE SERPL-MCNC: 4.2 MG/DL (ref 2.3–4.7)
PLATELET # BLD AUTO: 227 X10(3)/MCL (ref 130–400)
PMV BLD AUTO: 9.6 FL (ref 7.4–10.4)
POTASSIUM SERPL-SCNC: 4.6 MMOL/L (ref 3.5–5.1)
PROT SERPL-MCNC: 6.7 GM/DL (ref 5.8–7.6)
RBC # BLD AUTO: 4.27 X10(6)/MCL (ref 4.2–5.4)
SODIUM SERPL-SCNC: 137 MMOL/L (ref 136–145)
WBC # SPEC AUTO: 10.4 X10(3)/MCL (ref 4.5–11.5)

## 2022-11-29 PROCEDURE — 25000003 PHARM REV CODE 250: Performed by: NURSE PRACTITIONER

## 2022-11-29 PROCEDURE — 94640 AIRWAY INHALATION TREATMENT: CPT

## 2022-11-29 PROCEDURE — 25000003 PHARM REV CODE 250: Performed by: INTERNAL MEDICINE

## 2022-11-29 PROCEDURE — 21400001 HC TELEMETRY ROOM

## 2022-11-29 PROCEDURE — 36415 COLL VENOUS BLD VENIPUNCTURE: CPT | Performed by: INTERNAL MEDICINE

## 2022-11-29 PROCEDURE — 25000003 PHARM REV CODE 250: Performed by: PSYCHIATRY & NEUROLOGY

## 2022-11-29 PROCEDURE — 63600175 PHARM REV CODE 636 W HCPCS: Performed by: NURSE PRACTITIONER

## 2022-11-29 PROCEDURE — 85025 COMPLETE CBC W/AUTO DIFF WBC: CPT | Performed by: INTERNAL MEDICINE

## 2022-11-29 PROCEDURE — 84100 ASSAY OF PHOSPHORUS: CPT | Performed by: INTERNAL MEDICINE

## 2022-11-29 PROCEDURE — 97112 NEUROMUSCULAR REEDUCATION: CPT

## 2022-11-29 PROCEDURE — 25000242 PHARM REV CODE 250 ALT 637 W/ HCPCS: Performed by: INTERNAL MEDICINE

## 2022-11-29 PROCEDURE — 83735 ASSAY OF MAGNESIUM: CPT | Performed by: INTERNAL MEDICINE

## 2022-11-29 PROCEDURE — 97116 GAIT TRAINING THERAPY: CPT | Mod: CQ

## 2022-11-29 PROCEDURE — 80053 COMPREHEN METABOLIC PANEL: CPT | Performed by: INTERNAL MEDICINE

## 2022-11-29 PROCEDURE — 97535 SELF CARE MNGMENT TRAINING: CPT

## 2022-11-29 PROCEDURE — 97530 THERAPEUTIC ACTIVITIES: CPT | Mod: CQ

## 2022-11-29 RX ORDER — LEVALBUTEROL INHALATION SOLUTION 0.63 MG/3ML
0.63 SOLUTION RESPIRATORY (INHALATION) EVERY 12 HOURS
Status: DISCONTINUED | OUTPATIENT
Start: 2022-11-29 | End: 2022-12-01 | Stop reason: HOSPADM

## 2022-11-29 RX ADMIN — GABAPENTIN 300 MG: 300 CAPSULE ORAL at 09:11

## 2022-11-29 RX ADMIN — AMLODIPINE BESYLATE 10 MG: 5 TABLET ORAL at 09:11

## 2022-11-29 RX ADMIN — ASPIRIN 325 MG: 325 TABLET, COATED ORAL at 09:11

## 2022-11-29 RX ADMIN — ENOXAPARIN SODIUM 40 MG: 40 INJECTION SUBCUTANEOUS at 04:11

## 2022-11-29 RX ADMIN — MUPIROCIN: 20 OINTMENT TOPICAL at 09:11

## 2022-11-29 RX ADMIN — LEVALBUTEROL HYDROCHLORIDE 0.63 MG: 0.63 SOLUTION RESPIRATORY (INHALATION) at 07:11

## 2022-11-29 RX ADMIN — CLOPIDOGREL BISULFATE 75 MG: 75 TABLET ORAL at 09:11

## 2022-11-29 RX ADMIN — DOCUSATE SODIUM 100 MG: 100 CAPSULE, LIQUID FILLED ORAL at 09:11

## 2022-11-29 RX ADMIN — OXYCODONE HYDROCHLORIDE AND ACETAMINOPHEN 1 TABLET: 5; 325 TABLET ORAL at 09:11

## 2022-11-29 RX ADMIN — OXYCODONE HYDROCHLORIDE AND ACETAMINOPHEN 1 TABLET: 5; 325 TABLET ORAL at 03:11

## 2022-11-29 RX ADMIN — GABAPENTIN 300 MG: 300 CAPSULE ORAL at 02:11

## 2022-11-29 RX ADMIN — ATORVASTATIN CALCIUM 80 MG: 40 TABLET, FILM COATED ORAL at 09:11

## 2022-11-29 RX ADMIN — PANTOPRAZOLE SODIUM 40 MG: 40 TABLET, DELAYED RELEASE ORAL at 09:11

## 2022-11-29 NOTE — PT/OT/SLP PROGRESS
Physical Therapy Treatment    Patient Name:  Darrion Bermudez   MRN:  [id number]    Recommendations:     Discharge Recommendations:  rehabilitation facility   Discharge Equipment Recommendations:       Subjective     Patient awake and alert.     Objective:     General Precautions: Standard, fall   Orthopedic Precautions:    Braces:    Respiratory Status: Room air     Communicated with nurse prior to treatment.     Functional Mobility:    Rolling:Moderate Assistance  Supine to sit:Moderate Assistance  Sit to stand transfer: Moderate Assistance  Bed to chair transfer:Moderate Assistance  /78 and parameter are <220/110. Standing bedside with RW and assist with right hand placement but unable to block right knee with walker. Gait in coulter using handrail 5 ft , 10 ft, 15 ft mod assist. Pt has increase motor planning abilities with RLE and increase movement in right hand. Pt is an excellent rehab candidate and needs in patient stay to return pt to [facility].       AM-PAC 6 CLICK MOBILITY        Patient left up in chair with all lines intact and call button in reach..    GOALS:   Multidisciplinary Problems       Physical Therapy Goals          Problem: Physical Therapy    Goal Priority Disciplines Outcome Goal Variances Interventions   Physical Therapy Goal     PT, PT/OT Ongoing, Progressing     Description: Goals to be met by: 2022     Patient will increase functional independence with mobility by performin. Supine to sit with Modified Moody  2. Sit to supine with Modified Moody  3. Sit to stand transfer with Modified Moody  4. Bed to chair transfer with Modified Moody using Rolling Walker  5. Gait  x 300 feet with Modified Moody using Rolling Walker.                          Assessment:     Darrion Bermudez is a 71 y.o. female admitted with a medical diagnosis of Acute ischemic stroke.     Rehab Prognosis: Good; patient would benefit from acute skilled PT services to address  these deficits and reach maximum level of function.    Recent Surgery: Procedure(s) (LRB):  Insertion, Implantable Loop Recorder (N/A) 1 Day Post-Op    Plan:     During this hospitalization, patient to be seen  (daily/BID) to address the identified rehab impairments via gait training, therapeutic activities, therapeutic exercises, neuromuscular re-education and progress toward the following goals:    Plan of Care Expires:  12/26/22    Billable Minutes     Billable Minutes: Gait Training 30 and Therapeutic Activity 10    Treatment Type: Treatment  PT/PTA: PTA     PTA Visit Number: 1     11/29/2022

## 2022-11-29 NOTE — PROGRESS NOTES
[facility] Hospital Medicine Progress Note        Chief Complaint: Inpatient Follow-up for acute ischemia stroke with Rt Hemiparesis     HPI:   Ms. Bermudez is a 71 year old female with PMH as below who presented to the ED as a transfer from [facility] for neuro services.  She originally presented to the ED with complaints of  headache and right arm and leg weakness/numbness.  CT head performed there negative.  There are conflicting stories about onset of symptoms, whether it was 3:00 this afternoon or starting yesterday.  Regardless, family declined tPA and she was transferred to [facility].      ED lab work performed here showed mild hypokalemia, otherwise unremarkable.  CTA head and neck showed moderate atheromatous calcification of the supraclinoid segment of the bilateral internal carotid artery is seen with mild stenosis.  Severe atheromatous calcification with moderate stenosis at the origin of the right internal carotid artery seen.  An acute to subacute infarct is seen in the left corona radiata.  Neurology was consulted in the ED.  The patient was admitted to Hospital Medicine for management.     11/25-Pt is fully awake , alert, oriented x3, speech is clear and fluent. Remains with Rt sided weakness with muscle power RUE 1/5, RLE 3/5, Left U/L Ext 5/5. MRI brain attempted x 2 , but pt was unable to undergo due to severe anxiety and claustrophobia. Ativan IV was tried. SLP cleared pt for regular diet. PT/OT to see pt.  Continue ASA, Plavix , statin , permissive HTN x 24h. Cardiology consulted to evaluate for LINQ placement.      11/26-Cardiology consult obtained and possible LINQ placement on Monday. Neuro suggested to increase ASA to 325 mg. Continue Plavix and high intensity statin. PT/OT suggested Rehab placement.      Interval Hx:   Remains with Rt sided weakness with MS 2/5 Rt U/L ext. LINQ placement scheduled for today. PT/TO suggested rehab placement. CM consult to assist  with DC planning.     Objective/physical exam:  General: In no acute distress, afebrile  Chest: Clear to auscultation bilaterally  Heart: RRR, +S1, S2, no appreciable murmur  Abdomen: Soft, nontender, BS +  MSK: Warm, no lower extremity edema, no clubbing or cyanosis  Neurologic: Alert and oriented x4, Cranial nerve II-XII intact, Strength 2/5 RUE, 2/5, RLE,5/5 left U/L ext.     VITAL SIGNS: 24 HRS MIN & MAX LAST   Temp  Min: 97.9 °F (36.6 °C)  Max: 98.5 °F (36.9 °C) 98.5 °F (36.9 °C)   BP  Min: 101/60  Max: 163/77 101/60   Pulse  Min: 64  Max: 85  67   Resp  Min: 18  Max: 20 18   SpO2  Min: 91 %  Max: 96 % (!) 94 %         Recent Labs   Lab 11/24/22 1827 11/25/22  0309   WBC 5.9 6.5   RBC 4.37 4.08*   HGB 13.5 12.3   HCT 40.5 37.7   MCV 92.7 92.4   MCH 30.9 30.1   MCHC 33.3 32.6*   RDW 13.4 13.5    183   MPV 9.9 9.6       Recent Labs   Lab 11/24/22 1828 11/25/22  0309 11/26/22  0621    138 137   K 3.2* 3.6 3.8   CO2 23 23 25   BUN 16.1 16.2 12.5   CREATININE 0.87 0.74 0.77   CALCIUM 9.0 8.7 8.7   MG  --  1.80 2.00   ALBUMIN 3.6 3.4  --    ALKPHOS 78 79  --    ALT 21 26  --    AST 25 30  --    BILITOT 0.4 0.4  --           Microbiology Results (last 7 days)       ** No results found for the last 168 hours. **             See below for Radiology    Scheduled Med:   amLODIPine  10 mg Oral Daily    aspirin  325 mg Oral Daily    atorvastatin  80 mg Oral Daily    clopidogreL  75 mg Oral Daily    docusate sodium  100 mg Oral Daily    enoxaparin  40 mg Subcutaneous Daily    gabapentin  300 mg Oral TID    mupirocin   Nasal BID    pantoprazole  40 mg Oral Daily        Continuous Infusions:       PRN Meds:  acetaminophen, calcium carbonate, labetalol, lorazepam, ondansetron, oxyCODONE-acetaminophen       Assessment/Plan:  Acute Ischemic stroke with Rt hemiparesis - CTA head showed an acute to subacute infarct is seen in the left corona radiata.  Prior H/O CVA x 2 without residual deficit   Essential  HTN  Hyperlipidemia   COPD without acute exacerbation - continue inhaled bronchodilators   Former smoker   H/O Lung cancer Ca s/p Left lobectomy, chemo, radiation in 2017     Plan-   ASA increased to 325 mg daily per Neuro recs   Continue plavix , high intensity statin   Permissive HTN x 24h   MRI brain attempted x 2, however pt was not able to undergo due to severe anxiety and claustrophobia   PT/OT/SLP eval   Cardiology consult for LINQ placement   Neurology is following        VTE prophylaxis: Lovenox    Patient condition:  Fair.     Anticipated discharge and Disposition:         All diagnosis and differential diagnosis have been reviewed; assessment and plan has been documented; I have personally reviewed the labs and test results that are presently available; I have reviewed the patients medication list; I have reviewed the consulting providers response and recommendations. I have reviewed or attempted to review medical records based upon their availability    All of the patient's questions have been  addressed and answered. Patient's is agreeable to the above stated plan. I will continue to monitor closely and make adjustments to medical management as needed.  _____________________________________________________________________    Nutrition Status:    Radiology:  CV Ultrasound Bilateral Doppler Carotid  The left internal carotid artery demonstrated less than 50% stenosis.   Patent left distal common carotid artery stent.  The right internal carotid artery demonstrated 50-69% stenosis.  The bilateral vertebral arteries were patent with antegrade flow.      Leo Villalta MD   11/28/2022

## 2022-11-29 NOTE — PT/OT/SLP PROGRESS
"Occupational Therapy  Treatment    Darrion Bermudez   MRN: [id number]   Admitting Diagnosis: Acute ischemic stroke    OT Date of Treatment: 11/29/22   OT Start Time: 1035  OT Stop Time: 1132  OT Total Time (min): 57 min     Billable Minutes:  Self Care/Home Management 45 and Neuromuscular Re-education 12  Total Minutes: 57     OT/DYLAN: OT     DYLAN Visit Number: 2    General Precautions: Standard, fall (BP <220/100)  Orthopedic Precautions:    Braces:      Spiritual, Cultural Beliefs, Buddhist Practices, Values that Affect Care: no    Subjective:  "I had my stroke while I was having Thanksgiving dinner with my family."    Pain/Comfort  Pain Rating 1: 0/10    Objective:  Patient found with: João, peripheral IV, telemetry    Functional Mobility:    Transfer Training:  - STS = mod A initially, then min A with repetition using proper tech & rocking to increase momentum   - Chair <> BSC = min A (very minimal assist to t/f to L side & min A to t/f to R side)    UE Dressing:  - pullover shirt = min A (slight assist to don shirt over R shoulder)    LE Dressing:  - pants = mod A (assist to don pants over R hip in standing as well as assist for balance to block R knee 2/2 buckling)  - socks = mod A (assist with R sock to cross R LE into figure 4 position over LLE and hold LE in place while pt donned sock; IND with L sock) (partial AROM of R knee extension and dorsiflexion noted during ADL)      Additional Treatment:  Pt performed static standing with RW weightshifting from R<>L to increase RUE Wbing on RW and RLE Wbing on floor for increased proprioceptive input to faciliate improved standing balance. Assist required to maintain grasp of R hand on RW.    Pt performed R shoulder AAROM forward/backward table slides x 10 reps each to facilitate increased proximal UE voluntary movement and motor planning.    Patient left up in chair with all lines intact and call button in reach    ASSESSMENT:  Pt educated on hemibody dressing " technique demo improved UB and LB dressing skilled from max A --> min A UBD and max A --> mod A for LBD. Pt demo improved STS t/f with mult reps from mod A --> min A. Once standing, pt requires min A for static standing balance with and without RW to block R knee from buckling. Pt demo improved squat pivot t/f to BSC with increased control of t/f. Pt with greater ease and less assistance for balance to t/f to L side vs R side. Pt with improved Wbing through R UE/LE with RW in static standing balance and decrease R knee buckling noted with increased attention to task. Pt able to move R shoulder forward/backward on table in gravity eliminated state with slow movement and increased time required.     GOALS:   Multidisciplinary Problems       Occupational Therapy Goals          Problem: Occupational Therapy    Goal Priority Disciplines Outcome Interventions   Occupational Therapy Goal     OT, PT/OT Ongoing, Progressing    Description: Pt will t/f bed/chair <>BSC min A  Pt will increase R UE  strength to 3/5.  Pt will complete UE dressing min A  Pt will complete grooming sitting at sink SBA  Pt will increase static standing with CGA with hemiwalker.                              11/29/2022

## 2022-11-29 NOTE — PROGRESS NOTES
[facility] Neuro  Cardiology  Progress Note    Patient Name: Darrion Bermudez  MRN: [id number]  Admission Date: 11/24/2022  Hospital Length of Stay: 5 days  Code Status: Full Code   Attending Provider: Nayana Sanon MD   Consulting Provider: JEANETH Desai  Primary Care Physician: No primary care provider on file.  Principal Problem:Acute ischemic stroke    Patient information was obtained from patient, past medical records, and ER records.     Subjective:     Chief Complaint:   right sided weakness, headache    HPI:   Ms. Bermudez is a 70 y/o female, unknown to [facility], with PMHx significant for CVA, lung cancer/lobectomy, and HTN who presented to [place] ED with c/o Right sided weakness and headache pain, onset 6-12hr PTA. CTA head negative. She was transferred to [facility] for neurology services. CTA head/neck negative LVO but does reveal p LCCA focal narrowing and 50-60% stenosis Right carotid bulb. An acute to subacute infarct is seen in left corona radiata. MRI Pending. Neurology has been consulted and is requesting LINQ placement. Echo reveals normal EF with G1DD. No bubble study completed. Patient has been in SR since admit    Hospital Course:  11/27/22: Patient unable to undergo MRI due to severe anxiety/claustrophobia. Neuro has increased ASA to 325 mg daily with plavix and atorvastatin. Plans for loop recorder on Monday. SR per tele review  11/28/22: Patient awake in bed. NAD. VSS. Plans for LINQ placement today  11/29/22: Patient working with PT. S/p LINQ placement yesterday. Site benign    PMH: ICH, ROMANA/LCEA/stent, COPD, HTN, HLD, anxiety, lung cancer/lobectomy/ chemo and radiation 2017, CVA x 2 without residual deficit  PSH: Left CEA; L Carotid stent x 2, left lobectomy, right leg stent, back surgery x 2  Family History: none  Social History: former smoker, Denies ETOH or illicit drug use    Previous Cardiac Diagnostics:   CUS 11/26/22:  LICA < 50% stenosis. Patent left distal  CCA  QUE 50-69% stenosis  Bilateral vertebral arteries patent with antegrade flow.    TTE 11/25/22:  There is no evidence of intracardiac shunting. The left ventricle is normal in size with normal systolic function. The estimated ejection fraction is 60-65%. Mild-to- moderate mitral regurgitation. Grade I left ventricular diastolic dysfunction. Mild-to- moderate aortic regurgitation. Mild tricuspid regurgitation. Normal central venous pressure (3 mmHg). The estimated PA systolic pressure is 37 mmHg.    No current facility-administered medications on file prior to encounter.     Current Outpatient Medications on File Prior to Encounter   Medication Sig    YUPELRI 175 mcg/3 mL Nebu Take 3 mLs by nebulization once daily.    amLODIPine (NORVASC) 10 MG tablet Take 10 mg by mouth once daily. At bedtime    benzonatate (TESSALON) 100 MG capsule Take 100 mg by mouth 2 (two) times a day.    clopidogreL (PLAVIX) 75 mg tablet Take 75 mg by mouth once daily.    furosemide (LASIX) 20 MG tablet Take 20 mg by mouth once daily.    gabapentin (NEURONTIN) 300 MG capsule Take 300 mg by mouth 3 (three) times daily.    LORazepam (ATIVAN) 0.5 MG tablet Take 0.5 mg by mouth every 12 (twelve) hours as needed for Anxiety.    rosuvastatin (CRESTOR) 20 MG tablet Take 20 mg by mouth once daily.       Review of Systems   Respiratory:  Negative for cough and shortness of breath.    Cardiovascular:  Negative for chest pain and palpitations.   Gastrointestinal: Negative.    Genitourinary: Negative.    Musculoskeletal: Negative.    Skin: Negative.    Neurological:  Positive for weakness.   Psychiatric/Behavioral: Negative.       Objective:     Vital Signs (Most Recent):  Temp: 97.6 °F (36.4 °C) (11/29/22 0702)  Pulse: 66 (11/29/22 0702)  Resp: 18 (11/29/22 0702)  BP: 129/79 (11/29/22 0702)  SpO2: (!) 93 % (11/29/22 0702)   Vital Signs (24h Range):  Temp:  [97.4 °F (36.3 °C)-98.5 °F (36.9 °C)] 97.6 °F (36.4 °C)  Pulse:  [62-67] 66  Resp:  [16-20]  18  SpO2:  [91 %-96 %] 93 %  BP: (101-156)/(56-82) 129/79     Weight: 89.8 kg (198 lb)  Body mass index is 31.01 kg/m².    SpO2: (!) 93 %  O2 Device (Oxygen Therapy): room air      Intake/Output Summary (Last 24 hours) at 11/29/2022 1005  Last data filed at 11/29/2022 0907  Gross per 24 hour   Intake 240 ml   Output 900 ml   Net -660 ml         Significant Labs:  Recent Results (from the past 72 hour(s))   CV Ultrasound Bilateral Doppler Carotid    Collection Time: 11/26/22 10:41 AM   Result Value Ref Range    Left ICA/CCA ratio 0.65     Right ICA/CCA ratio 3.11     Left Highest ICA 91.00     Left Highest      Right Highest .00     Right Highest CCA 66     Right Highest EDV 46.00     LT Highest EDV 21.00     Right CCA prox sys 50 cm/s    Right CCA dist sys 66 cm/s    Right ICA prox sys 205 cm/s    Right ICA prox rangel 46 cm/s    Right ICA mid sys 202 cm/s    Right ICA mid rangel 42 cm/s    Right ICA dist sys 154 cm/s    Right ICA dist rangel 28 cm/s    Right ECA sys 121 cm/s    Right vertebral sys 50 cm/s    Left CCA prox sys 149 cm/s    Left CCA dist sys 139 cm/s    Left ICA prox sys 91 cm/s    Left ICA prox rangel 19 cm/s    Left ICA mid sys 67 cm/s    Left ICA mid rangel 18 cm/s    Left ICA dist sys 71 cm/s    Left ICA dist rangel 21 cm/s    Left ECA sys 167 cm/s    Left vertebral sys 78 cm/s   Hemoglobin A1C    Collection Time: 11/26/22 10:42 AM   Result Value Ref Range    Hemoglobin A1c 6.4 <=7.0 %    Estimated Average Glucose 137.0 mg/dL   Comprehensive Metabolic Panel    Collection Time: 11/29/22  4:05 AM   Result Value Ref Range    Sodium Level 137 136 - 145 mmol/L    Potassium Level 4.6 3.5 - 5.1 mmol/L    Chloride 103 98 - 107 mmol/L    Carbon Dioxide 24 23 - 31 mmol/L    Glucose Level 100 82 - 115 mg/dL    Blood Urea Nitrogen 19.4 9.8 - 20.1 mg/dL    Creatinine 0.77 0.55 - 1.02 mg/dL    Calcium Level Total 9.8 8.4 - 10.2 mg/dL    Protein Total 6.7 5.8 - 7.6 gm/dL    Albumin Level 3.4 3.4 - 4.8 gm/dL     Globulin 3.3 2.4 - 3.5 gm/dL    Albumin/Globulin Ratio 1.0 (L) 1.1 - 2.0 ratio    Bilirubin Total 0.3 <=1.5 mg/dL    Alkaline Phosphatase 71 40 - 150 unit/L    Alanine Aminotransferase 23 0 - 55 unit/L    Aspartate Aminotransferase 23 5 - 34 unit/L    eGFR >60 mls/min/1.73/m2   Magnesium    Collection Time: 11/29/22  4:05 AM   Result Value Ref Range    Magnesium Level 1.90 1.60 - 2.60 mg/dL   Phosphorus    Collection Time: 11/29/22  4:05 AM   Result Value Ref Range    Phosphorus Level 4.2 2.3 - 4.7 mg/dL   CBC with Differential    Collection Time: 11/29/22  4:05 AM   Result Value Ref Range    WBC 10.4 4.5 - 11.5 x10(3)/mcL    RBC 4.27 4.20 - 5.40 x10(6)/mcL    Hgb 13.2 12.0 - 16.0 gm/dL    Hct 40.8 37.0 - 47.0 %    MCV 95.6 (H) 80.0 - 94.0 fL    MCH 30.9 27.0 - 31.0 pg    MCHC 32.4 (L) 33.0 - 36.0 mg/dL    RDW 13.7 11.5 - 17.0 %    Platelet 227 130 - 400 x10(3)/mcL    MPV 9.6 7.4 - 10.4 fL    Neut % 70.0 %    Lymph % 22.2 %    Mono % 5.7 %    Eos % 1.1 %    Basophil % 0.4 %    Lymph # 2.31 0.6 - 4.6 x10(3)/mcL    Neut # 7.3 2.1 - 9.2 x10(3)/mcL    Mono # 0.59 0.1 - 1.3 x10(3)/mcL    Eos # 0.11 0 - 0.9 x10(3)/mcL    Baso # 0.04 0 - 0.2 x10(3)/mcL    IG# 0.06 (H) 0 - 0.04 x10(3)/mcL    IG% 0.6 %    NRBC% 0.0 %       Significant Imaging:  Imaging Results              CTA Head and Neck (xpd) (Final result)  Result time 11/25/22 09:35:41      Final result by Millie Bee MD (11/25/22 09:35:41)                   Impression:      1. No large vessel occlusion.  2. Atherosclerotic changes of the carotid arteries with moderate focal narrowing of the proximal left common carotid artery and 50-60% stenosis at the right carotid bulb by NASCET criteria.  No significant change from the [facility] interpretation.      Electronically signed by: Millie Bee  Date:    11/25/2022  Time:    09:35               Narrative:    EXAMINATION:  CTA HEAD AND NECK (XPD)    CLINICAL HISTORY:  Stroke/TIA, determine embolic  source;    TECHNIQUE:  Axial images obtained through the cervical region and [place] before and after the administration of intravenous contrast.    Coronal, sagittal, MIP and 3D reconstructions were obtained from the axial data.    Automatic exposure control was utilized to limit radiation dose.    Radiation Dose:    Total DLP: 2500 mGy*cm    COMPARISON:  Outside CT head dated 11/24/2022    FINDINGS:  Head CT with contrast:    No interval changes when compared to the previous CT.    No enhancing abnormalities.    If present, stenosis of the carotid bulbs is measured based on NASCET criteria,    i.e. area of maximal stenosis compared to the cervical ICA distal to the bulb.    Cervical CTA:    The origins of the great vessels are patent with moderate scattered calcifications and moderate focal narrowing of the proximal left common carotid artery.    There are atherosclerotic changes of the common carotid arteries with a stent in place on the left.  There are calcifications at the carotid bulbs with 50-60% stenosis on the right.  The internal carotid arteries are otherwise patent.    The dural venous sinuses are patent.    Intracranial CTA:    There are calcifications along the course of the carotid siphons without hemodynamically significant stenosis.  The middle cerebral arteries and anterior cerebral arteries are patent.    The vertebral arteries are patent with mild scattered calcifications.  The basilar artery and posterior cerebral arteries are patent.    The dural venous sinuses are patent.                        Preliminary result by Millie Bee MD (11/24/22 20:13:52)                   Narrative:    START OF REPORT:  Technique: CT angiogram of the intracranial vessels was performed with intravenous contrast with direct axial as well as sagittal and coronal reformations. CT angiogram of the neck vessels was performed with intravenous contrast with direct axial as well as sagittal and coronal  reformations.    Comparison: None.    Clinical history: Right sided weakness.    Findings:  Intracranial Vascular structures:  Internal carotid arteries: Moderate atheromatous calcification of the supraclinoid segment of the bilateral internal carotid artery is seen with mild stenosis.  Middle cerebral arteries: Unremarkable.  Anterior cerebral arteries: Unremarkable.  Vertebral arteries: Unremarkable.  Basilar artery: Unremarkable.  Posterior cerebral arteries: Unremarkable.  Posterior communicating arteries: Unremarkable.  Carotids:  Common carotid arteries: Severe atheromatous calcification of the mid and distal segments of the bilateral common carotid artery is seen with moderate stenosis in the right.  Internal carotid artery: Severe atheromatous calcification with moderate stenosis at the origin of the right internal carotid artery is seen.  Vertebral arteries: Unremarkable.  CSF spaces: The ventricles sulci and basal cisterns are within normal limits for age.  Brain parenchyma: There is preservation of the grey white junction throughout. Subtle microvascular change is seen in portions of the periventricular and deep white matter tracts.  Infarct: An acute to subacute infarct is seen in the left corona radiata.  Cerebellum: Unremarkable.  Sella and skull base: The sella appears to be within normal limits.  Cerebellopontine angles: Within normal limits.  Herniation: None.  Intracranial calcifications: Incidental note is made of bilateral choroid plexus calcification. Incidental note is made of some pineal region calcification. Incidental note is made of minimal basal ganglia calcifcation.    Miscellaneous: Air fluid levels are seen in the bilateral maxillary sinuses, consistent with sinusitis.      Impression:  1. Moderate atheromatous calcification of the supraclinoid segment of the bilateral internal carotid artery is seen with mild stenosis.  2. Severe atheromatous calcification with moderate stenosis at the  origin of the right internal carotid artery is seen.  3. An acute to subacute infarct is seen in the left corona radiata. Recommend MRI correlation for further evaluation.  4. Details and other findings as described above.                          Preliminary result by Lang Bauer MD (11/24/22 20:13:52)                   Narrative:    START OF REPORT:  Technique: CT angiogram of the intracranial vessels was performed with intravenous contrast with direct axial as well as sagittal and coronal reformations. CT angiogram of the neck vessels was performed with intravenous contrast with direct axial as well as sagittal and coronal reformations.    Comparison: None.    Clinical history: Right sided weakness.    Findings:  Intracranial Vascular structures:  Internal carotid arteries: Moderate atheromatous calcification of the supraclinoid segment of the bilateral internal carotid artery is seen with mild stenosis.  Middle cerebral arteries: Unremarkable.  Anterior cerebral arteries: Unremarkable.  Vertebral arteries: Unremarkable.  Basilar artery: Unremarkable.  Posterior cerebral arteries: Unremarkable.  Posterior communicating arteries: Unremarkable.  Carotids:  Common carotid arteries: Severe atheromatous calcification of the mid and distal segments of the bilateral common carotid artery is seen with moderate stenosis in the right.  Internal carotid artery: Severe atheromatous calcification with moderate stenosis at the origin of the right internal carotid artery is seen.  Vertebral arteries: Unremarkable.  CSF spaces: The ventricles sulci and basal cisterns are within normal limits for age.  Brain parenchyma: There is preservation of the grey white junction throughout. Subtle microvascular change is seen in portions of the periventricular and deep white matter tracts.  Infarct: An acute to subacute infarct is seen in the left corona radiata.  Cerebellum: Unremarkable.  Sella and skull base: The sella appears to be  within normal limits.  Cerebellopontine angles: Within normal limits.  Herniation: None.  Intracranial calcifications: Incidental note is made of bilateral choroid plexus calcification. Incidental note is made of some pineal region calcification. Incidental note is made of minimal basal ganglia calcifcation.    Miscellaneous: Air fluid levels are seen in the bilateral maxillary sinuses, consistent with sinusitis.      Impression:  1. Moderate atheromatous calcification of the supraclinoid segment of the bilateral internal carotid artery is seen with mild stenosis.  2. Severe atheromatous calcification with moderate stenosis at the origin of the right internal carotid artery is seen.  3. An acute to subacute infarct is seen in the left corona radiata. Recommend MRI correlation for further evaluation.  4. Details and other findings as described above.                                        Telemetry:  SR    Physical Exam  HENT:      Mouth/Throat:      Mouth: Mucous membranes are moist.   Cardiovascular:      Rate and Rhythm: Normal rate and regular rhythm.      Pulses: Normal pulses.      Heart sounds: Normal heart sounds.   Pulmonary:      Effort: Pulmonary effort is normal.      Breath sounds: Normal breath sounds.   Abdominal:      Palpations: Abdomen is soft.   Musculoskeletal:         General: Normal range of motion.   Skin:     General: Skin is warm.      Capillary Refill: Capillary refill takes less than 2 seconds.      Comments: Left chest wall incision DYLAN with dermabond   Neurological:      Mental Status: She is alert.      Motor: Weakness present.       Home Medications:   No current facility-administered medications on file prior to encounter.     Current Outpatient Medications on File Prior to Encounter   Medication Sig Dispense Refill    YUPELRI 175 mcg/3 mL Nebu Take 3 mLs by nebulization once daily.      amLODIPine (NORVASC) 10 MG tablet Take 10 mg by mouth once daily. At bedtime      benzonatate  (TESSALON) 100 MG capsule Take 100 mg by mouth 2 (two) times a day.      clopidogreL (PLAVIX) 75 mg tablet Take 75 mg by mouth once daily.      furosemide (LASIX) 20 MG tablet Take 20 mg by mouth once daily.      gabapentin (NEURONTIN) 300 MG capsule Take 300 mg by mouth 3 (three) times daily.      LORazepam (ATIVAN) 0.5 MG tablet Take 0.5 mg by mouth every 12 (twelve) hours as needed for Anxiety.      rosuvastatin (CRESTOR) 20 MG tablet Take 20 mg by mouth once daily.         Current Inpatient Medications:    Current Facility-Administered Medications:     acetaminophen tablet 650 mg, 650 mg, Oral, Q6H PRN, GRICELDA Richardson    amLODIPine tablet 10 mg, 10 mg, Oral, Daily, Leo Villalta MD, 10 mg at 11/29/22 0910    aspirin EC tablet 325 mg, 325 mg, Oral, Daily, Baron Chaney MD, 325 mg at 11/29/22 0909    atorvastatin tablet 80 mg, 80 mg, Oral, Daily, Nayana Sanon MD, 80 mg at 11/29/22 0910    calcium carbonate 200 mg calcium (500 mg) chewable tablet 500 mg, 500 mg, Oral, TID PRN, Leo Villalta MD, 500 mg at 11/28/22 2015    clopidogreL tablet 75 mg, 75 mg, Oral, Daily, Nayana Sanon MD, 75 mg at 11/29/22 0909    docusate sodium capsule 100 mg, 100 mg, Oral, Daily, BARTOLO Richardson-BC, 100 mg at 11/29/22 0910    enoxaparin injection 40 mg, 40 mg, Subcutaneous, Daily, ITALIA RichardsonP-BC, 40 mg at 11/27/22 1611    gabapentin capsule 300 mg, 300 mg, Oral, TID, Nayana Sanon MD, 300 mg at 11/29/22 0910    labetaloL injection 10 mg, 10 mg, Intravenous, Q2H PRN, GRICELDA Richardson    levalbuterol nebulizer solution 0.63 mg, 0.63 mg, Nebulization, Q8H PRN, Leo Villalta MD    LIDOcaine HCL 10 mg/ml (1%) injection, , , PRN, Ashley Christopher MD, 20 mL at 11/28/22 1719    LORazepam (ATIVAN) injection 1 mg, 1 mg, Intravenous, Q12H PRN, Leo Villalta MD, 1 mg at 11/28/22 1712    mupirocin 2 % ointment, , Nasal, BID, Terrance Retana MD, Given at 11/29/22 0912    ondansetron injection  4 mg, 4 mg, Intravenous, Q4H PRN, Elisha Kerns, AGACNP-BC    oxyCODONE-acetaminophen 5-325 mg per tablet 1 tablet, 1 tablet, Oral, Q6H PRN, BARTOLO Richardson-BC, 1 tablet at 11/28/22 2015    pantoprazole EC tablet 40 mg, 40 mg, Oral, Daily, Nayana Sanon MD, 40 mg at 11/29/22 0910         VTE Risk Mitigation (From admission, onward)           Ordered     enoxaparin injection 40 mg  Daily         11/25/22 0017     IP VTE HIGH RISK PATIENT  Once         11/25/22 0017     Place sequential compression device  Until discontinued         11/25/22 0017                    Assessment:   Acute to subacute Lacunar infarct  --prior hx CVA x 2 without residual deficit  B - ROMANA  --LICA < 50% stenosis  --QUE 50-69% stenosis  HTN  HLD  COPD  EF 60-65% with Grade I DD  VHD  --Mild to moderate AR/TR  Hx Lung cancer s/p Left lobectomy/chemo/radiation 2017  Former smoker    Plan:   Continue aspirin, statin, and plavix. No need to increase ASA to 325 mg as patient does not derive any increased benefit from higher dosage.   S/p LINQ placement. Site bening  Monitor ROMANA outpatient. No interventions needed presently. Continue aspirin and statin.     [facility] will sign off. Reconsult if needed.     Litzy Padilla, FNP  Cardiology  [facility] - Ortho Neuro  11/29/2022

## 2022-11-30 PROCEDURE — 97116 GAIT TRAINING THERAPY: CPT | Mod: CQ

## 2022-11-30 PROCEDURE — 25000003 PHARM REV CODE 250: Performed by: NURSE PRACTITIONER

## 2022-11-30 PROCEDURE — 99900035 HC TECH TIME PER 15 MIN (STAT)

## 2022-11-30 PROCEDURE — 25000003 PHARM REV CODE 250: Performed by: PSYCHIATRY & NEUROLOGY

## 2022-11-30 PROCEDURE — 25000003 PHARM REV CODE 250: Performed by: INTERNAL MEDICINE

## 2022-11-30 PROCEDURE — 94640 AIRWAY INHALATION TREATMENT: CPT

## 2022-11-30 PROCEDURE — 97535 SELF CARE MNGMENT TRAINING: CPT

## 2022-11-30 PROCEDURE — 21400001 HC TELEMETRY ROOM

## 2022-11-30 PROCEDURE — 94761 N-INVAS EAR/PLS OXIMETRY MLT: CPT

## 2022-11-30 PROCEDURE — 25000242 PHARM REV CODE 250 ALT 637 W/ HCPCS: Performed by: INTERNAL MEDICINE

## 2022-11-30 PROCEDURE — 97110 THERAPEUTIC EXERCISES: CPT

## 2022-11-30 PROCEDURE — 63600175 PHARM REV CODE 636 W HCPCS: Performed by: NURSE PRACTITIONER

## 2022-11-30 RX ADMIN — PANTOPRAZOLE SODIUM 40 MG: 40 TABLET, DELAYED RELEASE ORAL at 09:11

## 2022-11-30 RX ADMIN — GABAPENTIN 300 MG: 300 CAPSULE ORAL at 08:11

## 2022-11-30 RX ADMIN — ATORVASTATIN CALCIUM 80 MG: 40 TABLET, FILM COATED ORAL at 09:11

## 2022-11-30 RX ADMIN — AMLODIPINE BESYLATE 10 MG: 5 TABLET ORAL at 09:11

## 2022-11-30 RX ADMIN — LEVALBUTEROL HYDROCHLORIDE 0.63 MG: 0.63 SOLUTION RESPIRATORY (INHALATION) at 08:11

## 2022-11-30 RX ADMIN — CLOPIDOGREL BISULFATE 75 MG: 75 TABLET ORAL at 09:11

## 2022-11-30 RX ADMIN — GABAPENTIN 300 MG: 300 CAPSULE ORAL at 09:11

## 2022-11-30 RX ADMIN — ASPIRIN 325 MG: 325 TABLET, COATED ORAL at 09:11

## 2022-11-30 RX ADMIN — ENOXAPARIN SODIUM 40 MG: 40 INJECTION SUBCUTANEOUS at 04:11

## 2022-11-30 RX ADMIN — GABAPENTIN 300 MG: 300 CAPSULE ORAL at 04:11

## 2022-11-30 RX ADMIN — DOCUSATE SODIUM 100 MG: 100 CAPSULE, LIQUID FILLED ORAL at 09:11

## 2022-11-30 RX ADMIN — OXYCODONE HYDROCHLORIDE AND ACETAMINOPHEN 1 TABLET: 5; 325 TABLET ORAL at 08:11

## 2022-11-30 RX ADMIN — LEVALBUTEROL HYDROCHLORIDE 0.63 MG: 0.63 SOLUTION RESPIRATORY (INHALATION) at 09:11

## 2022-11-30 NOTE — PLAN OF CARE
Verbal FOC obtained for acute rehab.  1st choice is [facility] rehab; 2nd is [facility]. Referral sent to [facility] via [facility]. I called [facility] to inform of referral and they do have beds available. They will call after they review pt.

## 2022-11-30 NOTE — PT/OT/SLP PROGRESS
Occupational Therapy  Treatment    Darrion Bermudez   MRN: [id number]   Admitting Diagnosis: Acute ischemic stroke    OT Date of Treatment: 11/30/22   OT Start Time: 1150  OT Stop Time: 1250  OT Total Time (min): 60 min     Billable Minutes:  Self Care/Home Management 30 and Therapeutic Exercise 30  Total Minutes: 60     OT/DYLAN: OT     DYLAN Visit Number: 3    General Precautions: Standard, fall (BP <220/100)  Orthopedic Precautions:    Braces:      Spiritual, Cultural Beliefs, [employer] Practices, Values that Affect Care: no    Subjective:  CNA giving pt sponge bath seated UIC upon arrival to tx session.     Pain/Comfort  Pain Rating 1: 0/10    Objective:  Patient found with: telemetry, peripheral IV    Transfer Training:  -STS = min-mod A multiple reps during ADLs.    Grooming:  - Oral Hygiene = Mod I with L non-dominant UE seated in chair (pt able to open all containers IND. OT encouraged use of R hand to hold toothbrush while L hand squeezed toothpaste on brush.)    Bathing:  - Completed sponge bath with pt seated up in chair. CNA already performed UB cleaning, so pt performed LB and perineal cleaning with OT. Pt able to wash BLE's seated in chair at SPV level with L UE. Pt then required min A for static standing balance with blocking of R knee to clean ant/post perineal area and assist to clean R butt cheek.      LE Dressing:  - Don/doff B socks = min A (SPV to doff B socks & slight min A to lift and cross R LE into figure 4 position. No assistance required this visit to hold R LE in figure 4 position d/t increased strength and control of RLE)  - Don brief = pt stood with RW at min A level for static standing balance and blocking of R knee while therapist adjusted brief on pt.      Additional Treatment:  Pt participated in PEREZ AAROM seated in chair - sh flex, sh scaption, elbow flex/ext, wrist flex/ext, and finger flex/ext into fist x 5 reps each and AROM R forearm sup/pron x 10 reps to facilitate increased  strength and functional use of R dominant UE.    Patient left up in chair with all lines intact and call button in reach    ASSESSMENT:  Pt demo improved AROM of R LE and UE during ADLs and therex. Pt still with increased difficulty to initiate STS t/f at mod A, however once standing required min A for static standing balance d/t decreased strength in RLE and knee buckling occasionally. This is improved since yesterdays visit 2/2 decreased buckling noted throughout session. Pt also demo significant improvement with RUE A/AROM grossly, but easily fatigues after 5 reps. Shoulder A/AROM limited at ~90* 2/2 sharp/shooting pain due to possible impingement, elbow flex AROM ~45* before fatigue, elbow ext with full AROM however control of decent from flex required extended time to perform 2/2 decreased motor planning, forearm sup/prom with fill AROM, and finger flexion AROM improved from <25%  to ~75% active movement (joint stiffness noted at end ROM d/t mild edema present in hand). Overall pt progressing well towards goals.        GOALS:   Multidisciplinary Problems       Occupational Therapy Goals          Problem: Occupational Therapy    Goal Priority Disciplines Outcome Interventions   Occupational Therapy Goal     OT, PT/OT Ongoing, Progressing    Description: Pt will t/f bed/chair <>BSC min A  Pt will increase R UE  strength to 3/5.  Pt will complete UE dressing min A  Pt will complete grooming sitting at sink SBA  Pt will increase static standing with CGA with hemiwalker.                       Plan:  Patient to be seen daily to address the above listed problems via self-care/home management, therapeutic activities, therapeutic exercises, neuromuscular re-education  Plan of Care expires:    Plan of Care reviewed with: patient, daughter         11/30/2022

## 2022-11-30 NOTE — PLAN OF CARE
Problem: Adult Inpatient Plan of Care  Goal: Plan of Care Review  Outcome: Ongoing, Progressing  Flowsheets (Taken 11/30/2022 1040)  Plan of Care Reviewed With: patient  Goal: Optimal Comfort and Wellbeing  Outcome: Ongoing, Progressing  Intervention: Monitor Pain and Promote Comfort  Flowsheets (Taken 11/30/2022 1040)  Pain Management Interventions:   around-the-clock dosing utilized   quiet environment facilitated   relaxation techniques promoted   position adjusted   care clustered     Problem: Cerebral Tissue Perfusion (Stroke, Ischemic/Transient Ischemic Attack)  Goal: Optimal Cerebral Tissue Perfusion  Outcome: Ongoing, Progressing  Intervention: Protect and Optimize Cerebral Perfusion  Flowsheets (Taken 11/30/2022 1049)  Sensory Stimulation Regulation: quiet environment promoted     Problem: Cognitive Impairment (Stroke, Ischemic/Transient Ischemic Attack)  Goal: Optimal Cognitive Function  Outcome: Ongoing, Progressing  Intervention: Optimize Cognitive Function  Flowsheets (Taken 11/30/2022 1040)  Sensory Stimulation Regulation: quiet environment promoted     Problem: Communication Impairment (Stroke, Ischemic/Transient Ischemic Attack)  Goal: Improved Communication Skills  Outcome: Ongoing, Progressing  Intervention: Optimize Communication Skills  Flowsheets (Taken 11/30/2022 1040)  Communication Enhancement Strategies: call light answered in person     Problem: Urinary Elimination Impaired (Stroke, Ischemic/Transient Ischemic Attack)  Goal: Effective Urinary Elimination  Outcome: Ongoing, Progressing     Problem: Skin Injury Risk Increased  Goal: Skin Health and Integrity  Outcome: Ongoing, Progressing  Intervention: Optimize Skin Protection  Flowsheets (Taken 11/30/2022 1049)  Pressure Reduction Techniques: frequent weight shift encouraged  Head of Bed (HOB) Positioning: HOB at 30-45 degrees     Problem: Fall Injury Risk  Goal: Absence of Fall and Fall-Related Injury  Outcome: Ongoing,  Progressing  Intervention: Identify and Manage Contributors  Flowsheets (Taken 11/30/2022 1045)  Self-Care Promotion:   independence encouraged   safe use of adaptive equipment encouraged  Intervention: Promote Injury-Free Environment  Flowsheets (Taken 11/30/2022 1043)  Safety Promotion/Fall Prevention:   assistive device/personal item within reach   side rails raised x 2   instructed to call staff for mobility

## 2022-11-30 NOTE — PLAN OF CARE
Tammy Watts from [facility] rehab called and stated they will be able to accept pt tomorrow and need the pt to arrive by 2:00 pm. I spoke to pt and her son Kain ([phone]) about acceptance and Kain stated he will transport pt to [facility] as long as we are able to get pt into car and [facility] would get pt out of car. I spoke to Dr Lloyd and informed him of above and need for dc orders in the am.

## 2022-11-30 NOTE — PROGRESS NOTES
[facility] Hospital Medicine Progress Note        Chief Complaint: Inpatient Follow-up for  acute ischemia stroke with Rt Hemiparesis     HPI:   Ms. Bermudez is a 71 year old female with PMH as below who presented to the ED as a transfer from [facility] for neuro services.  She originally presented to the ED with complaints of  headache and right arm and leg weakness/numbness.  CT head performed there negative.  There are conflicting stories about onset of symptoms, whether it was 3:00 this afternoon or starting yesterday.  Regardless, family declined tPA and she was transferred to [facility].      ED lab work performed here showed mild hypokalemia, otherwise unremarkable.  CTA head and neck showed moderate atheromatous calcification of the supraclinoid segment of the bilateral internal carotid artery is seen with mild stenosis.  Severe atheromatous calcification with moderate stenosis at the origin of the right internal carotid artery seen.  An acute to subacute infarct is seen in the left corona radiata.  Neurology was consulted in the ED.  The patient was admitted to Hospital Medicine for management.     11/25-Pt is fully awake , alert, oriented x3, speech is clear and fluent. Remains with Rt sided weakness with muscle power RUE 1/5, RLE 3/5, Left U/L Ext 5/5. MRI brain attempted x 2 , but pt was unable to undergo due to severe anxiety and claustrophobia. Ativan IV was tried. SLP cleared pt for regular diet. PT/OT to see pt.  Continue ASA, Plavix , statin , permissive HTN x 24h. Cardiology consulted to evaluate for LINQ placement.      Cardiology consult obtained and LINQ placement on 11/28/22.  Neuro suggested to increase ASA to 325 mg. Continue Plavix and high intensity statin. PT/OT suggested Rehab placement. CM consulted to assist with DC planning.          Interval Hx:   S/P LINQ placement eysterday. Noted some improvement of RLE weakness, able to lift 30 degree. Able to move Rt  thumb. PT/OT continues . CM consulted to assist with rehab placement.     Objective/physical exam:  General: In no acute distress, afebrile  Chest: Clear to auscultation bilaterally  Heart: RRR, +S1, S2, no appreciable murmur  Abdomen: Soft, nontender, BS +  MSK: Warm, no lower extremity edema, no clubbing or cyanosis  Neurologic: Alert and oriented x4, Cranial nerve II-XII intact, Strength 2/5 RUE, 2/5, RLE,5/5 left U/L ext.     VITAL SIGNS: 24 HRS MIN & MAX LAST   Temp  Min: 97.4 °F (36.3 °C)  Max: 97.9 °F (36.6 °C) 97.9 °F (36.6 °C)   BP  Min: 115/56  Max: 143/76 118/72   Pulse  Min: 62  Max: 73  69   Resp  Min: 16  Max: 20 18   SpO2  Min: 93 %  Max: 94 % (!) 93 %         Recent Labs   Lab 11/24/22  1827 11/25/22  0309 11/29/22  0405   WBC 5.9 6.5 10.4   RBC 4.37 4.08* 4.27   HGB 13.5 12.3 13.2   HCT 40.5 37.7 40.8   MCV 92.7 92.4 95.6*   MCH 30.9 30.1 30.9   MCHC 33.3 32.6* 32.4*   RDW 13.4 13.5 13.7    183 227   MPV 9.9 9.6 9.6       Recent Labs   Lab 11/24/22  1828 11/25/22  0309 11/26/22  0621 11/29/22  0405    138 137 137   K 3.2* 3.6 3.8 4.6   CO2 23 23 25 24   BUN 16.1 16.2 12.5 19.4   CREATININE 0.87 0.74 0.77 0.77   CALCIUM 9.0 8.7 8.7 9.8   MG  --  1.80 2.00 1.90   ALBUMIN 3.6 3.4  --  3.4   ALKPHOS 78 79  --  71   ALT 21 26  --  23   AST 25 30  --  23   BILITOT 0.4 0.4  --  0.3          Microbiology Results (last 7 days)       ** No results found for the last 168 hours. **             See below for Radiology    Scheduled Med:   amLODIPine  10 mg Oral Daily    aspirin  325 mg Oral Daily    atorvastatin  80 mg Oral Daily    clopidogreL  75 mg Oral Daily    docusate sodium  100 mg Oral Daily    enoxaparin  40 mg Subcutaneous Daily    gabapentin  300 mg Oral TID    levalbuterol  0.63 mg Nebulization Q12H    mupirocin   Nasal BID    pantoprazole  40 mg Oral Daily        Continuous Infusions:       PRN Meds:  acetaminophen, calcium carbonate, labetalol, levalbuterol, LIDOcaine HCL 10 mg/ml (1%),  lorazepam, ondansetron, oxyCODONE-acetaminophen       Assessment/Plan:    Acute Ischemic stroke with Rt hemiparesis - CTA head showed an acute to subacute infarct is seen in the left corona radiata.  Prior H/O CVA x 2 without residual deficit   Essential HTN  Hyperlipidemia   COPD without acute exacerbation - continue inhaled bronchodilators   Former smoker   H/O Lung cancer Ca s/p Left lobectomy, chemo, radiation in 2017     Plan-   ASA increased to 325 mg daily per Neuro recs   Continue plavix , high intensity statin   Permissive HTN x 24h   MRI brain attempted x 2, however pt was not able to undergo due to severe anxiety and claustrophobia   PT/OT/SLP eval   Cardiology consult for LINQ placement   Neurology is following   VTE prophylaxis:     Patient condition: Fair.       Anticipated discharge and Disposition:         All diagnosis and differential diagnosis have been reviewed; assessment and plan has been documented; I have personally reviewed the labs and test results that are presently available; I have reviewed the patients medication list; I have reviewed the consulting providers response and recommendations. I have reviewed or attempted to review medical records based upon their availability    All of the patient's questions have been  addressed and answered. Patient's is agreeable to the above stated plan. I will continue to monitor closely and make adjustments to medical management as needed.  _____________________________________________________________________    Nutrition Status:    Radiology:  Electrophysiology Procedure    Successful implantation of Loop Recorder.    I certify that I was present for the critical steps of the procedure   including the diagnostic, surgical and/or interventional portions.     Procedure Log documented by No documenter listed and verified by Ashley Christopher MD.    Date: 11/28/2022  Time: 7:46 PM      Leo Villalta MD   11/29/2022

## 2022-11-30 NOTE — PT/OT/SLP PROGRESS
Physical Therapy Treatment    Patient Name:  Darrion Bermudez   MRN:  [id number]    Recommendations:     Discharge Recommendations:  rehabilitation facility   Discharge Equipment Recommendations:       Subjective     Patient awake and alert.     Objective:     General Precautions: Standard, fall   Orthopedic Precautions:    Braces:    Respiratory Status: Room air     Communicated with nurse prior to treatment.     Functional Mobility:    Rolling:Minimal Assistance  Supine to sit:Minimal Assistance  Sit to stand transfer: Moderate Assistance  Bed to chair transfer:Minimal Assistance  /73. Gait 12 ft x 2 hand rail mod assist and improve swing and hip/knee control. 5 ft also with RW due to increased use of RUE. Static and dynamic standing balance and standing PRE to improve strength and motor planning.  Excellent rehab candidate.       AM-PAC 6 CLICK MOBILITY        Patient left up in chair with all lines intact and call button in reach..    GOALS:   Multidisciplinary Problems       Physical Therapy Goals          Problem: Physical Therapy    Goal Priority Disciplines Outcome Goal Variances Interventions   Physical Therapy Goal     PT, PT/OT Ongoing, Progressing     Description: Goals to be met by: 2022     Patient will increase functional independence with mobility by performin. Supine to sit with Modified Edgar  2. Sit to supine with Modified Edgar  3. Sit to stand transfer with Modified Edgar  4. Bed to chair transfer with Modified Edgar using Rolling Walker  5. Gait  x 300 feet with Modified Edgar using Rolling Walker.                          Assessment:     Darrion Bermudez is a 71 y.o. female admitted with a medical diagnosis of Acute ischemic stroke.     Rehab Prognosis: Good; patient would benefit from acute skilled PT services to address these deficits and reach maximum level of function.    Recent Surgery: Procedure(s) (LRB):  Insertion, Implantable Loop  Recorder (N/A) 2 Days Post-Op    Plan:     During this hospitalization, patient to be seen  (daily/BID) to address the identified rehab impairments via gait training, therapeutic activities, therapeutic exercises, neuromuscular re-education and progress toward the following goals:    Plan of Care Expires:  12/26/22    Billable Minutes     Billable Minutes: Gait Training 30    Treatment Type: Treatment  PT/PTA: PTA     PTA Visit Number: 2     11/30/2022

## 2022-11-30 NOTE — PROGRESS NOTES
Hospital Medicine  Progress Note    Patient Name: Darrion Bermudez  MRN: [id number]  Status: IP- Inpatient   Admission Date: 11/24/2022  Length of Stay: 6      CC: hospital follow-up for CVA       SUBJECTIVE   71 year old female with a history that includes HTN and prior CVA, presented to the ED 11/24 as a transfer from [place] for neurological services.  She originally presented to the ED there with complaints of  headache and right arm and leg weakness/numbness.  CT head was negative.  There are conflicting stories about onset of symptoms, whether it was within appropriate window; but regardless, family declined tPA and she was transferred to [facility].  Labs noted mild hypokalemia, otherwise unremarkable.  CTA head and neck showed moderate atheromatous calcification of the supraclinoid segment of the bilateral internal carotid artery with mild stenosis; severe atheromatous calcification with moderate stenosis at the origin of the right internal carotid artery.  An acute to subacute infarct is seen in the left corona radiata.  Neurology was consulted in the and patient admitted to Hospital Medicine Services for management.  Continued on ASA, Plavix, statin. Cardiology consulted and Linq placed.  Continued right sided deficits.  Working with therapy services    Today: Patient seen and examined at bedside, and chart reviewed. Continues with dense right sided deficits, but working with therapy.      MEDICATIONS   Scheduled   amLODIPine  10 mg Oral Daily    aspirin  325 mg Oral Daily    atorvastatin  80 mg Oral Daily    clopidogreL  75 mg Oral Daily    docusate sodium  100 mg Oral Daily    enoxaparin  40 mg Subcutaneous Daily    gabapentin  300 mg Oral TID    levalbuterol  0.63 mg Nebulization Q12H    pantoprazole  40 mg Oral Daily     Continuous Infusions        PHYSICAL EXAM   VITALS: T 97.4 °F (36.3 °C)   /62   P 68   RR 19   O2 (!) 94 %    GENERAL: Awake and in NAD  LUNGS: CTA anteriorly  CVS: Normal rate  GI/:  Soft, NT, bowel sounds positive.  EXTREMITIES: No peripheral edema  NEURO: AAOx3, still with significant RUE/RLE deficits  PSYCH: Cooperative      LABS   CBC  Recent Labs     11/29/22  0405   WBC 10.4   HGB 13.2   HCT 40.8         CHEM  Recent Labs     11/29/22  0405      K 4.6   MG 1.90   CO2 24   BUN 19.4   CREATININE 0.77   CALCIUM 9.8   BILITOT 0.3   AST 23   ALT 23   ALKPHOS 71   ALBUMIN 3.4   PHOS 4.2       ASSESSMENT   Acute Ischemic stroke, left corona radiata  h/o prior CVA x2  Essential HTN  Hyperlipidemia   COPD without exacerbation   Former tobacco use  h/o Lung cancer Ca s/p Left lobectomy, chemo/radiation in 2017    PLAN   Continue with ASA/Plavix statin  Linq in place  Continue with therapy  CM working on rehab placement      Prophylaxis: NATALYA Lloyd MD  [facility]

## 2022-12-01 VITALS
HEART RATE: 67 BPM | RESPIRATION RATE: 20 BRPM | OXYGEN SATURATION: 92 % | WEIGHT: 198 LBS | DIASTOLIC BLOOD PRESSURE: 65 MMHG | BODY MASS INDEX: 31.08 KG/M2 | TEMPERATURE: 97 F | SYSTOLIC BLOOD PRESSURE: 107 MMHG | HEIGHT: 67 IN

## 2022-12-01 PROBLEM — I63.9 ACUTE ISCHEMIC STROKE: Status: RESOLVED | Noted: 2022-11-28 | Resolved: 2022-12-01

## 2022-12-01 PROCEDURE — 97535 SELF CARE MNGMENT TRAINING: CPT

## 2022-12-01 PROCEDURE — 25000003 PHARM REV CODE 250: Performed by: INTERNAL MEDICINE

## 2022-12-01 PROCEDURE — 25000003 PHARM REV CODE 250: Performed by: PSYCHIATRY & NEUROLOGY

## 2022-12-01 PROCEDURE — 25000003 PHARM REV CODE 250: Performed by: NURSE PRACTITIONER

## 2022-12-01 RX ORDER — FUROSEMIDE 20 MG/1
20 TABLET ORAL DAILY
Status: DISCONTINUED | OUTPATIENT
Start: 2022-12-01 | End: 2022-12-01 | Stop reason: HOSPADM

## 2022-12-01 RX ORDER — ASPIRIN 81 MG/1
81 TABLET ORAL DAILY
Qty: 30 TABLET | Refills: 0
Start: 2022-12-01 | End: 2023-12-01

## 2022-12-01 RX ADMIN — ATORVASTATIN CALCIUM 80 MG: 40 TABLET, FILM COATED ORAL at 08:12

## 2022-12-01 RX ADMIN — CLOPIDOGREL BISULFATE 75 MG: 75 TABLET ORAL at 08:12

## 2022-12-01 RX ADMIN — AMLODIPINE BESYLATE 10 MG: 5 TABLET ORAL at 08:12

## 2022-12-01 RX ADMIN — GABAPENTIN 300 MG: 300 CAPSULE ORAL at 08:12

## 2022-12-01 RX ADMIN — PANTOPRAZOLE SODIUM 40 MG: 40 TABLET, DELAYED RELEASE ORAL at 08:12

## 2022-12-01 RX ADMIN — DOCUSATE SODIUM 100 MG: 100 CAPSULE, LIQUID FILLED ORAL at 08:12

## 2022-12-01 RX ADMIN — ASPIRIN 325 MG: 325 TABLET, COATED ORAL at 08:12

## 2022-12-01 NOTE — PLAN OF CARE
Problem: Adult Inpatient Plan of Care  Goal: Plan of Care Review  Outcome: Ongoing, Progressing  Flowsheets (Taken 12/1/2022 0751)  Plan of Care Reviewed With: patient  Goal: Optimal Comfort and Wellbeing  Outcome: Ongoing, Progressing  Intervention: Monitor Pain and Promote Comfort  Flowsheets (Taken 12/1/2022 0751)  Pain Management Interventions:   around-the-clock dosing utilized   relaxation techniques promoted   quiet environment facilitated  Intervention: Provide Person-Centered Care  Flowsheets (Taken 12/1/2022 0751)  Trust Relationship/Rapport:   care explained   choices provided   emotional support provided   empathic listening provided   questions encouraged   thoughts/feelings acknowledged  Goal: Readiness for Transition of Care  Outcome: Ongoing, Progressing     Problem: Adjustment to Illness (Stroke, Ischemic/Transient Ischemic Attack)  Goal: Optimal Coping  Outcome: Ongoing, Progressing     Problem: Cerebral Tissue Perfusion (Stroke, Ischemic/Transient Ischemic Attack)  Goal: Optimal Cerebral Tissue Perfusion  Outcome: Ongoing, Progressing  Intervention: Protect and Optimize Cerebral Perfusion  Flowsheets (Taken 12/1/2022 0751)  Sensory Stimulation Regulation: quiet environment promoted     Problem: Cognitive Impairment (Stroke, Ischemic/Transient Ischemic Attack)  Goal: Optimal Cognitive Function  Outcome: Ongoing, Progressing  Intervention: Optimize Cognitive Function  Flowsheets (Taken 12/1/2022 0751)  Environment Familiarity/Consistency: personal clothing/items utilized  Sensory Stimulation Regulation: quiet environment promoted

## 2022-12-01 NOTE — PT/OT/SLP PROGRESS
"Occupational Therapy  Treatment    Darrion Bermudez   MRN: [id number]   Admitting Diagnosis: Acute ischemic stroke    OT Date of Treatment: 11/30/22   OT Start Time: 0930  OT Stop Time: 1002  OT Total Time (min): 32 min     Billable Minutes:  Self Care/Home Management 32  Total Minutes: 32     OT/DYLAN: OT     DYLAN Visit Number: 4    General Precautions: Standard, fall  Orthopedic Precautions:    Braces:      Spiritual, Cultural Beliefs, Adventism Practices, Values that Affect Care: no    Subjective:  "I have to get ready. I'm leaving in a little bit to go to [facility] for rehab."    Pain/Comfort  Pain Rating 1: 0/10    Objective:  Patient found with: telemetry, peripheral IV    Functional Mobility:  Bed Mobility:   Supine to sit: Minimal Assistance (assist to move RLE off bed)       Transfer Training:  - STS = min A  - Bed <> BSC stand pivot without AD = Min A     Grooming:  - Oral Hygiene seated EOB = mod I  - Wash hair using shower cap & brushing hair seated EOB = mod I    UE Dressing:  - Don pullover shirt = mod I    LE Dressing:  - Don pants & underwear = Min A (assist to don over R hip in standing)  - Don B socks = SPV (pt able to manipulate RLE in figure 4 position and maintain crossed leg without assist this visit)      Toilet Training:  Min A = Pt able to manage doffing garments at [facility] with increased time, pericare mod I seated on BSC, and min A to don pants over R hip in standing.       Additional Treatment:  Family training with pt's son on proper tech for stand pivot t/f.    Patient left up in chair with all lines intact and call button in reach    ASSESSMENT:  Pt con't to demo improved ADL performance and mobility requiring decreased assist from therapist for LBD and stand pivot t/f. Pt d/cing to [facility] rehab facility today to con't skilled OT services to improve occupational performance, functional use of R dominant UE, balance, and endurance.     Rehab potential is excellent    Activity tolerance: " Good    Discharge recommendations:   in-patient rehab    Equipment recommendations:   W/c, hemiwalker, BSC, TTB    GOALS:   Multidisciplinary Problems       Occupational Therapy Goals          Problem: Occupational Therapy    Goal Priority Disciplines Outcome Interventions   Occupational Therapy Goal     OT, PT/OT Ongoing, Progressing    Description: Pt will t/f bed/chair <>BSC min A  Pt will increase R UE  strength to 3/5.  Pt will complete UE dressing min A  Pt will complete grooming sitting at sink SBA  Pt will increase static standing with CGA with hemiwalker.                              12/01/2022

## 2022-12-01 NOTE — DISCHARGE SUMMARY
Hospital Medicine  Discharge Summary    Patient Name: Darrion Bermudez  MRN: [id number]  Admit Date: 11/24/2022  Discharge Date: 12/1/2022   Status: IP- Inpatient   Length of Stay: 7      PHYSICIANS   Admitting Physician: Francois Dunlap MD  Discharging Physician: Cornell Lloyd MD.  Primary Care Physician: No primary care provider on file.  Consults: Cardiology and Neurology      DISCHARGE DIAGNOSES   Acute Ischemic stroke, left corona radiata  h/o prior CVA x2  Essential HTN  Hyperlipidemia   COPD without exacerbation   Former tobacco use  h/o Lung cancer Ca s/p Left lobectomy, chemo/radiation in 2017      PROCEDURES   11/28/2022 - Cardiology -  Implantation of Loop Recorder      HOSPITAL COURSE    71 year old female with a history that includes HTN and prior CVA, presented to the ED 11/24 as a transfer from [place] for neurological services.  She originally presented to the ED there with complaints of  headache and right arm and leg weakness/numbness.  CT head was negative.  There are conflicting stories about onset of symptoms, whether it was within appropriate window; but regardless, family declined tPA and she was transferred to [facility].  Labs noted mild hypokalemia, otherwise unremarkable.  CTA head and neck showed moderate atheromatous calcification of the supraclinoid segment of the bilateral internal carotid artery with mild stenosis; severe atheromatous calcification with moderate stenosis at the origin of the right internal carotid artery.  An acute to subacute infarct is seen in the left corona radiata.  Neurology was consulted in the and patient admitted to Hospital Medicine Services for management.  Continued on ASA, Plavix, statin. Cardiology consulted and Linq placed.  Continued right sided deficits.  Working with therapy services.  CM arranged for inpatient rehab, patient stable for transfer.      STATUS  Improved    DISPOSITION  Discharge to Inpatient Rehab    DIET  Cardiac    ACTIVITY  As  tolerated      FOLLOW-UP      Dariel Naranjo MD. Go on 12/12/2022.    Specialties: Neurology, Interventional Neurology  Why: appt  o/p stroke f/u @ 1 pm  Contact information:  [address] [place] [address] Drive  [address]  [place] [address] [phone]                 DISCHARGE MEDICATION RECONCILIATION     PRESCRIBED     aspirin 81 MG EC tablet  Commonly known as: ECOTRIN  Take 1 tablet (81 mg total) by mouth once daily.       CONTINUE     amLODIPine 10 MG tablet  Commonly known as: NORVASC     benzonatate 100 MG capsule  Commonly known as: TESSALON     clopidogreL 75 mg tablet  Commonly known as: PLAVIX     furosemide 20 MG tablet  Commonly known as: LASIX     gabapentin 300 MG capsule  Commonly known as: NEURONTIN     LORazepam 0.5 MG tablet  Commonly known as: ATIVAN     rosuvastatin 20 MG tablet  Commonly known as: CRESTOR     YUPELRI 175 mcg/3 mL Nebu  Generic drug: revefenacin         PHYSICAL EXAM   VITALS: T 96.6 °F (35.9 °C)   /65   P 67   RR 20   O2 (!) 92 %    GENERAL: Awake and in NAD  LUNGS: CTA anteriorly  CVS: Normal rate  GI/: Soft, NT, bowel sounds positive.  EXTREMITIES: No peripheral edema  NEURO: AAOx3, still with significant RUE/RLE deficits  PSYCH: Cooperative      Discharge time: 33 minutes     Cornell Lloyd MD  Hospital Medicine       DIAGNOSITCS   CBC:   Recent Labs   Lab 11/24/22 1827 11/25/22  0309 11/29/22  0405   WBC 5.9 6.5 10.4   HGB 13.5 12.3 13.2   HCT 40.5 37.7 40.8    183 227     CMP:   Recent Labs   Lab 11/24/22  1828 11/25/22  0309 11/26/22  0621 11/29/22  0405   CALCIUM 9.0 8.7 8.7 9.8   ALBUMIN 3.6 3.4  --  3.4    138 137 137   K 3.2* 3.6 3.8 4.6   CO2 23 23 25 24   BUN 16.1 16.2 12.5 19.4   CREATININE 0.87 0.74 0.77 0.77   ALKPHOS 78 79  --  71   ALT 21 26  --  23   AST 25 30  --  23   BILITOT 0.4 0.4  --  0.3   MG  --  1.80 2.00 1.90   PHOS  --  3.2 3.4 4.2     Estimated Creatinine Clearance: 77.1 mL/min (based on SCr of 0.77 mg/dL).      Echo  Result Date:  11/25/2022  · There is no evidence of intracardiac shunting.   · The left ventricle is normal in size with normal systolic function.   · The estimated ejection fraction is 60-65%.   · Mild-to-moderate mitral regurgitation. · Grade I left ventricular diastolic dysfunction.   · Mild-to-moderate aortic regurgitation.   · Mild tricuspid regurgitation.   · Normal central venous pressure (3 mmHg).   · The estimated PA systolic pressure is 37 mmHg.      Electrophysiology Procedure  Result Date: 11/28/2022    Successful implantation of Loop Recorder. I certify that I was present for the critical steps of the procedure including the diagnostic, surgical and/or interventional portions.   Procedure Log documented by No documenter listed and verified by Ashley Christopher MD. Date: 11/28/2022  Time: 7:46 PM    CV Ultrasound Bilateral Doppler Carotid  Result Date: 11/26/2022  The left internal carotid artery demonstrated less than 50% stenosis. Patent left distal common carotid artery stent.   The right internal carotid artery demonstrated 50-69% stenosis.   The bilateral vertebral arteries were patent with antegrade flow.    CTA Head and Neck (xpd)  Result Date: 11/25/2022  EXAMINATION: CTA HEAD AND NECK (XPD) CLINICAL HISTORY: Stroke/TIA, determine embolic source; TECHNIQUE: Axial images obtained through the cervical region and [place] of [place] before and after the administration of intravenous contrast. Coronal, sagittal, MIP and 3D reconstructions were obtained from the axial data. Automatic exposure control was utilized to limit radiation dose. Radiation Dose: Total DLP: 2500 mGy*cm COMPARISON: Outside CT head dated 11/24/2022 FINDINGS: Head CT with contrast: No interval changes when compared to the previous CT. No enhancing abnormalities. If present, stenosis of the carotid bulbs is measured based on NASCET criteria, i.e. area of maximal stenosis compared to the cervical ICA distal to the bulb. Cervical CTA: The origins of  the great vessels are patent with moderate scattered calcifications and moderate focal narrowing of the proximal left common carotid artery. There are atherosclerotic changes of the common carotid arteries with a stent in place on the left.  There are calcifications at the carotid bulbs with 50-60% stenosis on the right.  The internal carotid arteries are otherwise patent. The dural venous sinuses are patent. Intracranial CTA: There are calcifications along the course of the carotid siphons without hemodynamically significant stenosis.  The middle cerebral arteries and anterior cerebral arteries are patent. The vertebral arteries are patent with mild scattered calcifications.  The basilar artery and posterior cerebral arteries are patent. The dural venous sinuses are patent.   Impression:  1. No large vessel occlusion.   2. Atherosclerotic changes of the carotid arteries with moderate focal narrowing of the proximal left common carotid artery and 50-60% stenosis at the right carotid bulb by NASCET criteria. No significant change from the [facility] interpretation.   Electronically signed by: Millie Bee Date:    11/25/2022 Time:    09:35

## 2022-12-01 NOTE — PLAN OF CARE
Faxed dc orders, MAR and AVS to [facility] rehab [phone]. Dr Michelle Becerra is the accepting physician. I spoke to pt and she stated her son is in route to hospital to take her to [facility] rehab.   DC packet given to pt's nurse Elaine with info to call report to [facility] rehab.

## 2023-01-27 ENCOUNTER — OFFICE VISIT (OUTPATIENT)
Dept: NEUROLOGY | Facility: CLINIC | Age: 73
End: 2023-01-27
Payer: MEDICARE

## 2023-01-27 VITALS
HEIGHT: 67 IN | BODY MASS INDEX: 31.08 KG/M2 | SYSTOLIC BLOOD PRESSURE: 137 MMHG | DIASTOLIC BLOOD PRESSURE: 76 MMHG | WEIGHT: 198 LBS | HEART RATE: 68 BPM

## 2023-01-27 DIAGNOSIS — I63.9 CEREBROVASCULAR ACCIDENT (CVA), UNSPECIFIED MECHANISM: Primary | ICD-10-CM

## 2023-01-27 PROCEDURE — 99213 PR OFFICE/OUTPT VISIT, EST, LEVL III, 20-29 MIN: ICD-10-PCS | Mod: S$PBB,,, | Performed by: NURSE PRACTITIONER

## 2023-01-27 PROCEDURE — 99214 OFFICE O/P EST MOD 30 MIN: CPT | Mod: PBBFAC | Performed by: NURSE PRACTITIONER

## 2023-01-27 PROCEDURE — 99213 OFFICE O/P EST LOW 20 MIN: CPT | Mod: S$PBB,,, | Performed by: NURSE PRACTITIONER

## 2023-01-27 PROCEDURE — 99999 PR PBB SHADOW E&M-EST. PATIENT-LVL IV: CPT | Mod: PBBFAC,,, | Performed by: NURSE PRACTITIONER

## 2023-01-27 PROCEDURE — 99999 PR PBB SHADOW E&M-EST. PATIENT-LVL IV: ICD-10-PCS | Mod: PBBFAC,,, | Performed by: NURSE PRACTITIONER

## 2023-01-27 RX ORDER — ARFORMOTEROL TARTRATE 15 UG/2ML
SOLUTION RESPIRATORY (INHALATION)
COMMUNITY
Start: 2023-01-13

## 2023-01-27 RX ORDER — PANTOPRAZOLE SODIUM 20 MG/1
20 TABLET, DELAYED RELEASE ORAL
COMMUNITY
Start: 2023-01-03

## 2023-01-27 NOTE — PROGRESS NOTES
Subjective:       Patient ID: Darrion Bermudez is a 72 y.o. female.    Chief Complaint:  Cerebrovascular Accident (Hospital f/u for CVA on 11/24/22, Patient has had a previous TIA in 2010. Last imaging done when patient was in the hospital. Symptoms at time of CVA were headache, slurred speech and R sided weakness. Patient does not have pacemaker but does have a LINQ recorder, patient denies any arrhythmias. Patient states every once in a while she will have blurred vision in R eye but unsure if related to CVA. Patient has AFO brace on R foot to hold toes up, also c/o some residual R sided weakness.)      History of Present Illness  Patient presents for follow up of CVA on 11/24/22. Patient presented to [facility] with reports of headache and right arm and leg weakness/numbness. CT head at the time was negative. tPA declined by family. Patient transferred to [facility] for neurological services. Labs were unremarkable. LDL was 59. A1C 6.4. CTA head and neck showed  atherosclerotic changes of the carotid arteries with moderate focal narrowing of the proximal left common carotid artery and 50-60% stenosis at the right carotid bulb by NASCET criteria.An acute to subacute infarct is seen in the left corona radiata. Cardiology was consulted and Linq placed. Patient was discharged to inpatient rehab on ASA/Plavix/statin. No abnormal rhythms have been noted on Linq monitor. Sees Dr. King Jean in [place].Today patient denies any new onset of numbness, tingling or weakness. Patient has some residual right sided upper and lower extremity weakness. Has AFO on right lower extremity. Reports she is overall doing well. Has right visual changes at times but had cataract surgery last year with scar tissue removal.            Echo  Result Date: 11/25/2022  · There is no evidence of intracardiac shunting.   · The left ventricle is normal in size with normal systolic function.   · The estimated ejection fraction is 60-65%.   ·  Mild-to-moderate mitral regurgitation. · Grade I left ventricular diastolic dysfunction.   · Mild-to-moderate aortic regurgitation.   · Mild tricuspid regurgitation.   · Normal central venous pressure (3 mmHg).   · The estimated PA systolic pressure is 37 mmHg.            CV Ultrasound Bilateral Doppler Carotid  Result Date: 11/26/2022  The left internal carotid artery demonstrated less than 50% stenosis. Patent left distal common carotid artery stent.   The right internal carotid artery demonstrated 50-69% stenosis.   The bilateral vertebral arteries were patent with antegrade flow.     CTA Head and Neck (xpd)  Result Date: 11/25/2022  EXAMINATION: CTA HEAD AND NECK (XPD) CLINICAL HISTORY: Stroke/TIA, determine embolic source; TECHNIQUE: Axial images obtained through the cervical region and [place] of [place] before and after the administration of intravenous contrast. Coronal, sagittal, MIP and 3D reconstructions were obtained from the axial data. Automatic exposure control was utilized to limit radiation dose. Radiation Dose: Total DLP: 2500 mGy*cm COMPARISON: Outside CT head dated 11/24/2022 FINDINGS: Head CT with contrast: No interval changes when compared to the previous CT. No enhancing abnormalities. If present, stenosis of the carotid bulbs is measured based on NASCET criteria, i.e. area of maximal stenosis compared to the cervical ICA distal to the bulb. Cervical CTA: The origins of the great vessels are patent with moderate scattered calcifications and moderate focal narrowing of the proximal left common carotid artery. There are atherosclerotic changes of the common carotid arteries with a stent in place on the left.  There are calcifications at the carotid bulbs with 50-60% stenosis on the right.  The internal carotid arteries are otherwise patent. The dural venous sinuses are patent. Intracranial CTA: There are calcifications along the course of the carotid siphons without hemodynamically significant  stenosis.  The middle cerebral arteries and anterior cerebral arteries are patent. The vertebral arteries are patent with mild scattered calcifications.  The basilar artery and posterior cerebral arteries are patent. The dural venous sinuses are patent.   Impression:  1. No large vessel occlusion.   2. Atherosclerotic changes of the carotid arteries with moderate focal narrowing of the proximal left common carotid artery and 50-60% stenosis at the right carotid bulb by NASCET criteria. No significant change from the [facility] interpretation.   Electronically signed by:         Millie Bee Date:                                                2022 Time:                                      09:35      Past Medical History:   Diagnosis Date    Acid reflux     Anxiety     CVA (cerebral vascular accident)     Hyperlipidemia     Hypertension     Lower extremity edema     Lung cancer     Neuropathy        Past Surgical History:   Procedure Laterality Date    CARPAL TUNNEL RELEASE      INSERTION OF IMPLANTABLE LOOP RECORDER N/A 2022    Procedure: Insertion, Implantable Loop Recorder;  Surgeon: Ashley Christopher MD;  Location: [facility] CATH LAB;  Service: Cardiology;  Laterality: N/A;    LUMBAR FUSION      LUNG REMOVAL, PARTIAL Left     SPINE SURGERY         Family History   Problem Relation Age of Onset    Heart attack Mother     Hypertension Mother     Asthma Father     Heart failure Father     Heart failure Sister     Diabetes Sister     Heart disease Sister     Hypertension Brother     Cancer Brother         liver cancer    Diabetes Brother     Heart disease Brother     Diabetes Brother        Social History     Socioeconomic History    Marital status: Single   Tobacco Use    Smoking status: Former     Packs/day: 1.50     Years: 50.00     Pack years: 75.00     Types: Cigarettes     Quit date: 5/3/2017     Years since quittin.7    Smokeless tobacco: Never   Substance and Sexual Activity    Alcohol use: Not  Currently    Drug use: Never    Sexual activity: Not Currently     Social Determinants of Health     Food Insecurity: Unknown    Worried About Running Out of Food in the Last Year: Never true   Transportation Needs: Unknown    Lack of Transportation (Medical): No   Housing Stability: Unknown    Unable to Pay for Housing in the Last Year: No       Current Outpatient Medications   Medication Sig Dispense Refill    amLODIPine (NORVASC) 5 MG tablet Take 5 mg by mouth once daily. At bedtime      arformoteroL (BROVANA) 15 mcg/2 mL Nebu       aspirin (ECOTRIN) 81 MG EC tablet Take 1 tablet (81 mg total) by mouth once daily. 30 tablet 0    clopidogreL (PLAVIX) 75 mg tablet Take 75 mg by mouth once daily.      furosemide (LASIX) 20 MG tablet Take 20 mg by mouth daily as needed.      gabapentin (NEURONTIN) 300 MG capsule Take 300 mg by mouth 3 (three) times daily.      LORazepam (ATIVAN) 0.5 MG tablet Take 0.5 mg by mouth every 12 (twelve) hours as needed for Anxiety.      pantoprazole (PROTONIX) 20 MG tablet Take 20 mg by mouth.      rosuvastatin (CRESTOR) 20 MG tablet Take 20 mg by mouth once daily.      YUPELRI 175 mcg/3 mL Nebu Take 3 mLs by nebulization once daily.       No current facility-administered medications for this visit.       Review of patient's allergies indicates:   Allergen Reactions    Lodine [etodolac] Hives      Vitals:    01/27/23 1011   BP: 137/76   Pulse: 68       Review of Systems  Review of Systems   Eyes:  Positive for visual disturbance.   Neurological:  Positive for weakness. Negative for dizziness, facial asymmetry, light-headedness and headaches.   All other systems reviewed and are negative.    Objective:      Neurologic Exam     Mental Status   Oriented to person, place, and time.   Speech: speech is normal   Level of consciousness: alert    Cranial Nerves     CN II   Visual fields full to confrontation.     CN III, IV, VI   Pupils are equal, round, and reactive to light.  Extraocular motions  are normal.     CN V   Facial sensation intact.     CN VII   Facial expression full, symmetric.     CN VIII   CN VIII normal.     CN XI   CN XI normal.     CN XII   Tongue: not atrophic    Motor Exam   Muscle bulk: normal    Strength   Strength 5/5 except as noted.   Right wrist flexion: 4/5  Right wrist extension: 4/5  Right quadriceps: 4/5  Right hamstrin/5  Right glutei: 4/5  Right anterior tibial: 4/5  Right posterior tibial: 4/5  Right peroneal: 4/5    Sensory Exam   Light touch normal.     Gait, Coordination, and Reflexes AFO to right lower extremity     Physical Exam  Vitals and nursing note reviewed.   Eyes:      Extraocular Movements: EOM normal.      Pupils: Pupils are equal, round, and reactive to light.   Pulmonary:      Effort: Pulmonary effort is normal.   Neurological:      Mental Status: She is oriented to person, place, and time.   Psychiatric:         Speech: Speech normal.         Assessment:        1. Cerebrovascular accident (CVA), unspecified mechanism      Plan:     Secondary stroke prevention measures discussed. Education provided on signs and symptoms of stroke; advised to call  with any new onset of numbness, tingling or weakness, difficulty with speech or facial droop.    Continue ASA/Plavix/crestor  Continue follow up with cardiology; no current Afib noted on Linq per patient

## 2023-04-27 ENCOUNTER — OFFICE VISIT (OUTPATIENT)
Dept: NEUROLOGY | Facility: CLINIC | Age: 73
End: 2023-04-27
Payer: MEDICARE

## 2023-04-27 VITALS
WEIGHT: 198 LBS | HEART RATE: 63 BPM | BODY MASS INDEX: 31.08 KG/M2 | HEIGHT: 67 IN | DIASTOLIC BLOOD PRESSURE: 80 MMHG | SYSTOLIC BLOOD PRESSURE: 156 MMHG

## 2023-04-27 DIAGNOSIS — I63.9 CEREBROVASCULAR ACCIDENT (CVA), UNSPECIFIED MECHANISM: Primary | ICD-10-CM

## 2023-04-27 PROCEDURE — 99213 OFFICE O/P EST LOW 20 MIN: CPT | Mod: PBBFAC | Performed by: NURSE PRACTITIONER

## 2023-04-27 PROCEDURE — 99213 PR OFFICE/OUTPT VISIT, EST, LEVL III, 20-29 MIN: ICD-10-PCS | Mod: S$PBB,,, | Performed by: NURSE PRACTITIONER

## 2023-04-27 PROCEDURE — 99213 OFFICE O/P EST LOW 20 MIN: CPT | Mod: S$PBB,,, | Performed by: NURSE PRACTITIONER

## 2023-04-27 PROCEDURE — 99999 PR PBB SHADOW E&M-EST. PATIENT-LVL III: CPT | Mod: PBBFAC,,, | Performed by: NURSE PRACTITIONER

## 2023-04-27 PROCEDURE — 99999 PR PBB SHADOW E&M-EST. PATIENT-LVL III: ICD-10-PCS | Mod: PBBFAC,,, | Performed by: NURSE PRACTITIONER

## 2023-04-27 RX ORDER — OMEPRAZOLE 20 MG/1
20 CAPSULE, DELAYED RELEASE ORAL
COMMUNITY
Start: 2023-02-13 | End: 2023-04-27 | Stop reason: CLARIF

## 2023-04-27 NOTE — PROGRESS NOTES
Subjective:      Patient ID: Darrion Bermudez is a 72 y.o. female.    Chief Complaint:  Cerebrovascular Accident (3 month follow up for CVA, patient denies any stroke like deficits. States she does get light headed every now and then.)      History of Present Illness  Patient presents for follow up of CVA on 11/24/22. Patient initially presented to hospital with reports of headache and right arm and leg weakness/numbness. An acute to subacute infarct was seen in the left corona radiata on imaging. Patient has Linq placed. Sees cardiologist Dr. Jean in [place]. Patient denies any new onset of numbness, tingling or weakness. Reports some dizziness at times. Reports mild tingling to her right finger tips. Recently was discharged from OT. No longer in an AFO brace. Denies any falls. No abnormal rhythms detected on Linq device.      Past Medical History:   Diagnosis Date    Acid reflux     Anxiety     CVA (cerebral vascular accident)     Hyperlipidemia     Hypertension     Lower extremity edema     Lung cancer     Neuropathy        Past Surgical History:   Procedure Laterality Date    CARPAL TUNNEL RELEASE      INSERTION OF IMPLANTABLE LOOP RECORDER N/A 11/28/2022    Procedure: Insertion, Implantable Loop Recorder;  Surgeon: Ashley Christopher MD;  Location: [facility] CATH LAB;  Service: Cardiology;  Laterality: N/A;    LUMBAR FUSION      LUNG REMOVAL, PARTIAL Left     SPINE SURGERY         Family History   Problem Relation Age of Onset    Heart attack Mother     Hypertension Mother     Asthma Father     Heart failure Father     Heart failure Sister     Diabetes Sister     Heart disease Sister     Hypertension Brother     Cancer Brother         liver cancer    Diabetes Brother     Heart disease Brother     Diabetes Brother        Social History     Socioeconomic History    Marital status: Single   Tobacco Use    Smoking status: Former     Packs/day: 1.50     Years: 50.00     Pack years: 75.00     Types: Cigarettes     Quit  date: 5/3/2017     Years since quittin.9    Smokeless tobacco: Never   Substance and Sexual Activity    Alcohol use: Not Currently    Drug use: Never    Sexual activity: Not Currently     Social Determinants of Health     Food Insecurity: Unknown    Worried About Running Out of Food in the Last Year: Never true   Transportation Needs: Unknown    Lack of Transportation (Medical): No   Housing Stability: Unknown    Unable to Pay for Housing in the Last Year: No       Current Outpatient Medications   Medication Sig Dispense Refill    amLODIPine (NORVASC) 5 MG tablet Take 5 mg by mouth once daily. At bedtime      arformoteroL (BROVANA) 15 mcg/2 mL Nebu       aspirin (ECOTRIN) 81 MG EC tablet Take 1 tablet (81 mg total) by mouth once daily. 30 tablet 0    clopidogreL (PLAVIX) 75 mg tablet Take 75 mg by mouth once daily.      furosemide (LASIX) 20 MG tablet Take 20 mg by mouth daily as needed.      gabapentin (NEURONTIN) 300 MG capsule Take 300 mg by mouth 3 (three) times daily.      LORazepam (ATIVAN) 0.5 MG tablet Take 0.5 mg by mouth every 12 (twelve) hours as needed for Anxiety.      pantoprazole (PROTONIX) 20 MG tablet Take 20 mg by mouth.      rosuvastatin (CRESTOR) 20 MG tablet Take 20 mg by mouth once daily.      YUPELRI 175 mcg/3 mL Nebu Take 3 mLs by nebulization once daily.       No current facility-administered medications for this visit.       Review of patient's allergies indicates:   Allergen Reactions    Lodine [etodolac] Hives      Vitals:    23 1033   BP: (!) 156/80   Pulse: 63        Review of Systems  Review of Systems   Neurological:  Negative for dizziness, facial asymmetry, weakness and headaches.   All other systems reviewed and are negative.  Objective:     Neurologic Exam     Mental Status   Oriented to person, place, and time.   Speech: speech is normal   Level of consciousness: alert    Cranial Nerves   Cranial nerves II through XII intact.     Motor Exam   Muscle bulk:  normal    Strength   Strength 5/5 throughout.     Sensory Exam   Light touch normal.     Gait, Coordination, and Reflexes     Gait  Gait: normal    Physical Exam  Vitals and nursing note reviewed.   Cardiovascular:      Rate and Rhythm: Normal rate.   Pulmonary:      Effort: Pulmonary effort is normal.   Neurological:      Mental Status: She is oriented to person, place, and time.      Cranial Nerves: Cranial nerves 2-12 are intact.      Motor: Motor strength is normal.      Gait: Gait is intact.   Psychiatric:         Speech: Speech normal.        Assessment:     1. Cerebrovascular accident (CVA), unspecified mechanism        Plan:     Secondary stroke prevention measures discussed. Education provided on signs and symptoms of stroke; advised to call 9-1-1 with any new onset of numbness, tingling or weakness, difficulty with speech or facial droop.    Continue ASA/Plavix/crestor  Continue follow up with cardiology; no current Afib noted on Linq per patient

## 2024-02-06 ENCOUNTER — OFFICE VISIT (OUTPATIENT)
Dept: NEUROLOGY | Facility: CLINIC | Age: 74
End: 2024-02-06
Payer: MEDICARE

## 2024-02-06 VITALS
DIASTOLIC BLOOD PRESSURE: 82 MMHG | BODY MASS INDEX: 31.08 KG/M2 | SYSTOLIC BLOOD PRESSURE: 134 MMHG | HEIGHT: 67 IN | WEIGHT: 198 LBS

## 2024-02-06 DIAGNOSIS — I63.9 CEREBROVASCULAR ACCIDENT (CVA), UNSPECIFIED MECHANISM: Primary | ICD-10-CM

## 2024-02-06 PROCEDURE — 99999 PR PBB SHADOW E&M-EST. PATIENT-LVL III: CPT | Mod: PBBFAC,,, | Performed by: NURSE PRACTITIONER

## 2024-02-06 PROCEDURE — 99213 OFFICE O/P EST LOW 20 MIN: CPT | Mod: S$PBB,,, | Performed by: NURSE PRACTITIONER

## 2024-02-06 PROCEDURE — 99213 OFFICE O/P EST LOW 20 MIN: CPT | Mod: PBBFAC | Performed by: NURSE PRACTITIONER

## 2024-02-06 NOTE — PROGRESS NOTES
Subjective:      Patient ID: Darrion Bermudez is a 73 y.o. female.    Chief Complaint:  Stroke (9 mos f/u. Patient denies HA, slurred speech, blurred vision or weakness. )      History of Present Illness  Patient presents for stroke follow up. Patient had left corona radiata CVA on 22. Patient initially presented to hospital with reports of headache and right arm and leg weakness/numbness. Sees cardiologist Dr. Jean in [place]. Patient denies any new onset of numbness, tingling or weakness. Reports recent cardiology workup with Echo, stress test due to blockage in her right carotid. Will be getting results next week. Has appointment with PCP next week for follow up of labs including cholesterol level.             Past Medical History:   Diagnosis Date    Acid reflux     Anxiety     CVA (cerebral vascular accident)     Hyperlipidemia     Hypertension     Lower extremity edema     Lung cancer     Neuropathy        Past Surgical History:   Procedure Laterality Date    CARPAL TUNNEL RELEASE      INSERTION OF IMPLANTABLE LOOP RECORDER N/A 2022    Procedure: Insertion, Implantable Loop Recorder;  Surgeon: Ashley Christopher MD;  Location: [facility] CATH LAB;  Service: Cardiology;  Laterality: N/A;    LUMBAR FUSION      LUNG REMOVAL, PARTIAL Left     SPINE SURGERY         Family History   Problem Relation Age of Onset    Heart attack Mother     Hypertension Mother     Asthma Father     Heart failure Father     Heart failure Sister     Diabetes Sister     Heart disease Sister     Hypertension Brother     Cancer Brother         liver cancer    Diabetes Brother     Heart disease Brother     Diabetes Brother        Social History     Socioeconomic History    Marital status:    Tobacco Use    Smoking status: Former     Current packs/day: 0.00     Average packs/day: 1.5 packs/day for 50.0 years (75.0 ttl pk-yrs)     Types: Cigarettes     Start date: 5/3/1967     Quit date: 5/3/2017     Years since quittin.7  "   Smokeless tobacco: Never   Substance and Sexual Activity    Alcohol use: Not Currently    Drug use: Never    Sexual activity: Not Currently     Social Determinants of Health     Food Insecurity: Unknown (11/25/2022)    Hunger Vital Sign     Worried About Running Out of Food in the Last Year: Never true   Transportation Needs: Unknown (11/25/2022)    PRAPARE - Transportation     Lack of Transportation (Medical): No   Housing Stability: Unknown (11/25/2022)    Housing Stability Vital Sign     Unable to Pay for Housing in the Last Year: No       Current Outpatient Medications   Medication Sig Dispense Refill    amLODIPine (NORVASC) 10 MG tablet Take 10 mg by mouth once daily. At bedtime      arformoteroL (BROVANA) 15 mcg/2 mL Nebu       clopidogreL (PLAVIX) 75 mg tablet Take 75 mg by mouth once daily.      furosemide (LASIX) 20 MG tablet Take 20 mg by mouth daily as needed.      gabapentin (NEURONTIN) 300 MG capsule Take 300 mg by mouth 3 (three) times daily.      LORazepam (ATIVAN) 0.5 MG tablet Take 0.5 mg by mouth every 12 (twelve) hours as needed for Anxiety.      pantoprazole (PROTONIX) 20 MG tablet Take 20 mg by mouth.      rosuvastatin (CRESTOR) 20 MG tablet Take 20 mg by mouth once daily.      YUPELRI 175 mcg/3 mL Nebu Take 3 mLs by nebulization once daily.      aspirin (ECOTRIN) 81 MG EC tablet Take 1 tablet (81 mg total) by mouth once daily. 30 tablet 0     No current facility-administered medications for this visit.       Review of patient's allergies indicates:   Allergen Reactions    Lodine [etodolac] Hives      Vitals:    02/06/24 1125   BP: 134/82   BP Location: Left arm   Patient Position: Sitting   Weight: 89.8 kg (198 lb)   Height: 5' 7" (1.702 m)      Review of Systems  12 point ROS performed and negative unless otherwise documented in HPI  Objective:      Neurologic Exam      Mental Status   Oriented to person, place, and time.   Speech: speech is normal   Level of consciousness: alert   "   Cranial Nerves      CN II   Visual fields full to confrontation.      CN III, IV, VI   Pupils are equal, round, and reactive to light.  Extraocular motions are normal.      CN V   Facial sensation intact.      CN VII   Facial expression full, symmetric.      CN VIII   CN VIII normal.      CN XI   CN XI normal.      CN XII   Tongue: not atrophic     Motor Exam   Muscle bulk: normal     Strength   Strength 5/5 except as noted.   Right wrist flexion: 4/5  Right wrist extension: 4/5  Right quadriceps: 4/5  Right hamstrin/5  Right glutei: 4/5  Right anterior tibial: 4/5  Right posterior tibial: 4/5  Right peroneal: 4/5     Sensory Exam   Light touch normal.      Gait, Coordination, and Reflexes AFO to right lower extremity      Physical Exam  Vitals and nursing note reviewed.   Eyes:      Extraocular Movements: EOM normal.      Pupils: Pupils are equal, round, and reactive to light.   Pulmonary:      Effort: Pulmonary effort is normal.   Neurological:      Mental Status: She is oriented to person, place, and time.   Psychiatric:         Speech: Speech normal.     Assessment:     1. Cerebrovascular accident (CVA), unspecified mechanism        Plan:   Secondary stroke prevention measures discussed. Education provided on signs and symptoms of stroke; advised to call 9--1 with any new onset of numbness, tingling or weakness, difficulty with speech or facial droop.    Continue ASA/Plavix  Goal A1C <7.0  Secondary stroke prevention measures discussed. Education provided on signs and symptoms of stroke; advised to call 9-1-1 with any new onset of numbness, tingling or weakness, difficulty with speech or facial droop.

## 2025-03-08 NOTE — NURSING
Patient and family given discharge teaching in regards to recent diagnosis. Patient and family were given a discharge packet to give to rehab and one for them to keep for themselves. Patient and family were also given medication education. Patient's family were instructed to call the number on the packet when they arrived to the rehab facility so that the rehab staff can assist the patient out of the car. Report was given to nurse You at the rehab facility and she had no further questions. Patient left with her linq recorder setup. Patient and family had no further questions regarding discharge teaching. Patient's IV was discontinued. Patient escorted down with hospital transportation and family by her side with her belongings. Patient was not distress at discharge.  
No

## (undated) DEVICE — CHLORAPREP 3ML APPLICATOR TINT

## (undated) DEVICE — Device

## (undated) DEVICE — DRAPE RADIAL BRACHIAL 29X42IN

## (undated) DEVICE — ADHESIVE DERMABOND ADVANCED